# Patient Record
Sex: MALE | Race: WHITE | NOT HISPANIC OR LATINO | Employment: FULL TIME | URBAN - METROPOLITAN AREA
[De-identification: names, ages, dates, MRNs, and addresses within clinical notes are randomized per-mention and may not be internally consistent; named-entity substitution may affect disease eponyms.]

---

## 2017-08-04 ENCOUNTER — APPOINTMENT (OUTPATIENT)
Dept: LAB | Facility: CLINIC | Age: 57
End: 2017-08-04
Payer: COMMERCIAL

## 2017-08-04 DIAGNOSIS — R73.01 IMPAIRED FASTING GLUCOSE: ICD-10-CM

## 2017-08-04 DIAGNOSIS — N52.9 MALE ERECTILE DYSFUNCTION: ICD-10-CM

## 2017-08-04 DIAGNOSIS — E78.5 HYPERLIPIDEMIA: ICD-10-CM

## 2017-08-04 LAB
ALBUMIN SERPL BCP-MCNC: 3.9 G/DL (ref 3.5–5)
ALP SERPL-CCNC: 71 U/L (ref 46–116)
ALT SERPL W P-5'-P-CCNC: 31 U/L (ref 12–78)
ANION GAP SERPL CALCULATED.3IONS-SCNC: 5 MMOL/L (ref 4–13)
AST SERPL W P-5'-P-CCNC: 25 U/L (ref 5–45)
BILIRUB SERPL-MCNC: 1.39 MG/DL (ref 0.2–1)
BUN SERPL-MCNC: 11 MG/DL (ref 5–25)
CALCIUM SERPL-MCNC: 9.5 MG/DL (ref 8.3–10.1)
CHLORIDE SERPL-SCNC: 105 MMOL/L (ref 100–108)
CHOLEST SERPL-MCNC: 187 MG/DL (ref 50–200)
CO2 SERPL-SCNC: 29 MMOL/L (ref 21–32)
CREAT SERPL-MCNC: 0.99 MG/DL (ref 0.6–1.3)
EST. AVERAGE GLUCOSE BLD GHB EST-MCNC: 120 MG/DL
GFR SERPL CREATININE-BSD FRML MDRD: 84 ML/MIN/1.73SQ M
GLUCOSE P FAST SERPL-MCNC: 105 MG/DL (ref 65–99)
HBA1C MFR BLD: 5.8 % (ref 4.2–6.3)
HDLC SERPL-MCNC: 50 MG/DL (ref 40–60)
LDLC SERPL CALC-MCNC: 125 MG/DL (ref 0–100)
POTASSIUM SERPL-SCNC: 4.2 MMOL/L (ref 3.5–5.3)
PROT SERPL-MCNC: 7.8 G/DL (ref 6.4–8.2)
SODIUM SERPL-SCNC: 139 MMOL/L (ref 136–145)
TRIGL SERPL-MCNC: 61 MG/DL

## 2017-08-04 PROCEDURE — 80053 COMPREHEN METABOLIC PANEL: CPT

## 2017-08-04 PROCEDURE — 36415 COLL VENOUS BLD VENIPUNCTURE: CPT

## 2017-08-04 PROCEDURE — 80061 LIPID PANEL: CPT

## 2017-08-04 PROCEDURE — 83036 HEMOGLOBIN GLYCOSYLATED A1C: CPT

## 2017-08-05 ENCOUNTER — GENERIC CONVERSION - ENCOUNTER (OUTPATIENT)
Dept: OTHER | Facility: OTHER | Age: 57
End: 2017-08-05

## 2017-11-09 ENCOUNTER — ALLSCRIPTS OFFICE VISIT (OUTPATIENT)
Dept: OTHER | Facility: OTHER | Age: 57
End: 2017-11-09

## 2017-11-09 DIAGNOSIS — Z00.00 ENCOUNTER FOR GENERAL ADULT MEDICAL EXAMINATION WITHOUT ABNORMAL FINDINGS: ICD-10-CM

## 2017-11-09 DIAGNOSIS — B35.0 TINEA BARBAE AND TINEA CAPITIS: ICD-10-CM

## 2017-11-09 DIAGNOSIS — E78.5 HYPERLIPIDEMIA: ICD-10-CM

## 2017-11-09 DIAGNOSIS — N52.9 MALE ERECTILE DYSFUNCTION: ICD-10-CM

## 2017-11-09 DIAGNOSIS — M54.16 RADICULOPATHY OF LUMBAR REGION: ICD-10-CM

## 2017-11-09 DIAGNOSIS — R73.01 IMPAIRED FASTING GLUCOSE: ICD-10-CM

## 2017-11-09 DIAGNOSIS — Z12.11 ENCOUNTER FOR SCREENING FOR MALIGNANT NEOPLASM OF COLON: ICD-10-CM

## 2017-11-10 NOTE — PROGRESS NOTES
Assessment    1  Hyperlipidemia (272 4) (E78 5)   2  IFG (impaired fasting glucose) (790 21) (R73 01)    Plan  Acute lumbar radiculopathy, ED (erectile dysfunction), Health Maintenance, Encounter forscreening colonoscopy, Folliculitis barbae, Hyperlipidemia, IFG (impaired fastingglucose)    · (1) BASIC METABOLIC PROFILE; Status:Active; Requested for:09Nov2017;    · (1) HEMOGLOBIN A1C; Status:Active; Requested WTF:[id number];    · (1) LIPID PANEL FASTING W DIRECT LDL REFLEX; Status:Active; Requestedfor:09Nov2017;   ED (erectile dysfunction)    · Cialis 5 MG Oral Tablet; TAKE 1 TO 2 TABLETS DAILY [address] [place] [address]    · *VB-Depression Screening; Status:Complete - Retrospective Authorization;   Done:09Nov2017 04:27PM  Hyperlipidemia    · Rosuvastatin Calcium 20 MG Oral Tablet; TAKE 1 TABLET AT BEDTIME    Discussion/Summary    Chronic cond stablein 6 months + labsproper dietflu at pharm  The patient was counseled regarding diagnostic results,-- instructions for management,-- risk factor reductions,-- prognosis,-- impressions,-- risks and benefits of treatment options,-- importance of compliance with treatment  total time of encounter was 30 minutes  Possible side effects of new medications were reviewed with the patient/guardian today  The treatment plan was reviewed with the patient/guardian  The patient/guardian understands and agrees with the treatment plan      Chief Complaint  pt here for follow up visitreceived flu vaccine in October   Patient is here today for follow up of chronic conditions described in HPI  History of Present Illness  Pt presents for follow up HLD labs done in august showed Predm with A1c at 5  8HLD is better control, with Crestor  issues with med and finally neing more complaintexercising again      Review of Systems   Constitutional: No fever or chills, feels well, no tiredness, no recent weight gain or weight loss    Cardiovascular: No complaints of slow heart rate, no fast heart rate, no chest pain, no palpitations, no leg claudication, no lower extremity  Respiratory: No complaints of shortness of breath, no wheezing, no cough, no SOB on exertion, no orthopnea or PND  Gastrointestinal: No complaints of abdominal pain, no constipation, no nausea or vomiting, no diarrhea or bloody stools  Genitourinary: No complaints of dysuria, no incontinence, no hesitancy, no nocturia, no genital lesion, no testicular pain  Musculoskeletal: No complaints of arthralgia, no myalgias, no joint swelling or stiffness, no limb pain or swelling  Active Problems  1  Acute lumbar radiculopathy (724 4) (M54 16)   2  ED (erectile dysfunction) (607 84) (N52 9)   3  Encounter for screening colonoscopy (V76 51) (Z12 11)   4  Folliculitis barbae (389 9) (B35 0)   5  Hyperlipidemia (272 4) (E78 5)   6  IFG (impaired fasting glucose) (790 21) (R73 01)   7  Oily skin (782 9) (R23 4)   8  Sacroiliitis (720 2) (M46 1)    Past Medical History  1  History of sebaceous cyst (V13 3) (Z87 2)   2  Impacted cerumen, unspecified laterality (380 4) (H61 20)    The active problems and past medical history were reviewed and updated today  Surgical History  1  History of Prior Surgical Procedure Not Done    Family History  Mother    1  Family history of Hyperlipidemia  Father    2  Family history of Lung Cancer (V16 1)  Maternal Grandfather    3  Family history of Diabetes Mellitus (V18 0)   4  Family history of Hypertension (V17 49)    Social History     · Alcohol Use (History)   · Caffeine Use   · Never A Smoker   · Occupation:  The social history was reviewed and updated today  Current Meds   1  Cialis 5 MG Oral Tablet; TAKE 1 TO 2 TABLETS DAILY 1 HOUR BEFORE NEEDED; Therapy: 21ZNL4391 to (Last Rx:04Apr2017)  Requested for: 02YPB4339; Status: ACTIVE - Renewal Denied Ordered   2   Diclofenac Sodium 1 % Transdermal Gel; APPLY SPARINGLY - LITE LAYER - UP TO 4 GRAMS PER DOSE, MAX 4 TIMES A DAY TO AFFECTED AREA; Therapy: 35MSB9922 to (Evaluate:14Jan2017)  Requested for: 84VIW7505; Last Rx:73Ksd7085 Ordered   3  Neutrogena Acne Cleansing Soap External Bar; USE AS DIRECTED ON PACKAGE; Therapy: 54ATK7914 to Recorded   4  Neutrogena Oil-Free Acne Wash 2 % External Liquid; use daily for oil control; Therapy: 38CFH8475 to (Last Rx:06Oct2015) Ordered   5  Omega-3-acid Ethyl Esters 1 GM Oral Capsule; TAKE 2 CAPSULES TWICE A DAY; Therapy: 75TVJ2118 to Recorded   6  Rosuvastatin Calcium 20 MG Oral Tablet; TAKE 1 TABLET AT BEDTIME; Therapy: 32SDP4883 to (Last Rx:14Jun2017)  Requested for: 99PDI5962 Ordered    The medication list was reviewed and updated today  Allergies  1  No Known Drug Allergies    Vitals  Vital Signs    Recorded: 92XXS1439 04:19PM   Temperature 98 1 F   Heart Rate 67   Respiration 18   Systolic 000   Diastolic 72   Height 5 ft 10 87 in   Weight 198 lb    BMI Calculated 27 72   BSA Calculated 2 1   O2 Saturation 98   Pain Scale 0       Physical Exam   Constitutional  General appearance: No acute distress, well appearing and well nourished  Pulmonary  Respiratory effort: No increased work of breathing or signs of respiratory distress  Auscultation of lungs: Clear to auscultation, equal breath sounds bilaterally, no wheezes, no rales, no rhonci  Cardiovascular  Auscultation of heart: Normal rate and rhythm, normal S1 and S2, without murmurs  Examination of extremities for edema and/or varicosities: Normal    Abdomen  Abdomen: Non-tender, no masses  Lymphatic  Palpation of lymph nodes in neck: No lymphadenopathy  Musculoskeletal  Gait and station: Normal    Skin  Skin and subcutaneous tissue: Normal without rashes or lesions     Psychiatric  Mood and affect: Normal          Results/Data  *VB-Depression Screening 34NTN0590 04:27PM [facility]     Test Name Result Flag Reference   Depression Scale Result      Depression Screen - Negative For Symptoms     PHQ-2 Adult Depression Screening 23JQR9608 04:26PM User, [facility]     Test Name Result Flag Reference   PHQ-2 Adult Depression Score 0       Over the last two weeks, how often have you been bothered by any of the following problems? Little interest or pleasure in doing things: Not at all - 0 Feeling down, depressed, or hopeless: Not at all - 0   PHQ-2 Adult Depression Screening Negative           Health Management  Encounter for screening colonoscopy   COLONOSCOPY; every 10 years; Next Due: 42PVL6480;  Overdue    Signatures   Electronically signed by : RICKY Quan ; Nov 9 2017  4:52PM EST                       (Author)

## 2018-01-13 VITALS
OXYGEN SATURATION: 98 % | HEIGHT: 71 IN | TEMPERATURE: 98.1 F | HEART RATE: 67 BPM | BODY MASS INDEX: 27.72 KG/M2 | RESPIRATION RATE: 18 BRPM | DIASTOLIC BLOOD PRESSURE: 72 MMHG | SYSTOLIC BLOOD PRESSURE: 118 MMHG | WEIGHT: 198 LBS

## 2018-01-13 NOTE — RESULT NOTES
Message   notify pt his results are back but I dont see a follow up appt 6 mo FU/cholesterol FU  Please make appt and I will discuss results then  Seems like he might not be taking his cholesterol med which we will need to adress at the apt  thanks     Verified Results  (1) COMPREHENSIVE METABOLIC PANEL 51KQR0881 43:72OV Mihir Venturaer Order Number: [id number]     Test Name Result Flag Reference   GLUCOSE,RANDM 110 mg/dL     If the patient is fasting, the ADA then defines impaired fasting glucose as > 100 mg/dL and diabetes as > or equal to 123 mg/dL  SODIUM 139 mmol/L  136-145   POTASSIUM 4 2 mmol/L  3 5-5 3   CHLORIDE 104 mmol/L  100-108   CARBON DIOXIDE 30 mmol/L  21-32   ANION GAP (CALC) 5 mmol/L  4-13   BLOOD UREA NITROGEN 19 mg/dL  5-25   CREATININE 1 12 mg/dL  0 60-1 30   Standardized to IDMS reference method   CALCIUM 9 1 mg/dL  8 3-10 1   BILI, TOTAL 1 06 mg/dL H 0 20-1 00   ALK PHOSPHATAS 88 U/L     ALT (SGPT) 78 U/L  12-78   AST(SGOT) 31 U/L  5-45   ALBUMIN 4 1 g/dL  3 5-5 0   TOTAL PROTEIN 7 9 g/dL  6 4-8 2   eGFR Non-African American      >60 0 ml/min/1 73sq [facility] Disease Education Program recommendations are as follows:  GFR calculation is accurate only with a steady state creatinine  Chronic Kidney disease less than 60 ml/min/1 73 sq  meters  Kidney failure less than 15 ml/min/1 73 sq  meters       (1) LIPID PANEL FASTING W DIRECT LDL REFLEX [id number] 07:20AM Mihir  Order Number: [id number]     Test Name Result Flag Reference   CHOLESTEROL 264 mg/dL H    LDL CHOLESTEROL CALCULATED 192 mg/dL H 0-100   Triglyceride:         Normal              <150 mg/dl       Borderline High    150-199 mg/dl       High               200-499 mg/dl       Very High          >499 mg/dl  Cholesterol:         Desirable        <200 mg/dl      Borderline High  200-239 mg/dl      High             >239 mg/dl  HDL Cholesterol:        High    >59 mg/dL      Low <41 mg/dL  LDL Cholesterol:        Optimal          <100 mg/dl        Near Optimal     100-129 mg/dl        Above Optimal          Borderline High   130-159 mg/dl          High              160-189 mg/dl          Very High        >189 mg/dl  LDL CALCULATED:    This screening LDL is a calculated result  It does not have the accuracy of the Direct Measured LDL in the monitoring of patients with hyperlipidemia and/or statin therapy  Direct Measure LDL (JYX831) must be ordered separately in these patients  TRIGLYCERIDES 102 mg/dL  <=150   Specimen collection should occur prior to N-Acetylcysteine or Metamizole administration due to the potential for falsely depressed results  HDL,DIRECT 52 mg/dL  40-60   Specimen collection should occur prior to Metamizole administration due to the potential for falsely depressed results         Signatures   Electronically signed by : RICKY Quan ; Nov 7 2016  8:44AM EST                       (Author)

## 2018-01-14 NOTE — RESULT NOTES
Message      Please notify pt labs are back cholesterol stable  Continue with SAME dose of crestor  mail him labs to get done prior to next apt in 6 months  tx       Verified Results  (1) LIPID PANEL FASTING W DIRECT LDL REFLEX 01Apr2016 07:41AM Sana Collins   Triglyceride:         Normal              <150 mg/dl       Borderline High    150-199 mg/dl       High               200-499 mg/dl       Very High          >499 mg/dl  Cholesterol:         Desirable        <200 mg/dl      Borderline High  200-239 mg/dl      High             >239 mg/dl  HDL Cholesterol:        High    >59 mg/dL      Low     <41 mg/dL  LDL Cholesterol:        Optimal          <100 mg/dl         Near Optimal     100-129 mg/dl        Above Optimal          Borderline High   130-159 mg/dl          High              160-189 mg/dl          Very High        >189 mg/dl  LDL CALCULATED:    This screening LDL is a calculated result  It does not have the accuracy of the Direct Measured LDL in the monitoring of patients with hyperlipidemia and/or statin therapy  Direct Measure LDL (XPA706) must be ordered separately in these patients  Test Name Result Flag Reference   CHOLESTEROL 222 mg/dL H    LDL CHOLESTEROL CALCULATED 142 mg/dL H 0-100   TRIGLYCERIDES 85 mg/dL  <=150   Specimen collection should occur prior to N-Acetylcysteine or Metamizole administration due to the potential for falsely depressed results  HDL,DIRECT 63 mg/dL H 40-60       Plan  Hyperlipidemia, IFG (impaired fasting glucose)    · (1) COMPREHENSIVE METABOLIC PANEL; Status:Active; Requested for:04Apr2016;    · (1) LIPID PANEL FASTING W DIRECT LDL REFLEX; Status:Active;  Requested  for:04Apr2016;     Signatures   Electronically signed by : RICKY Bustamante ; Apr 4 2016  9:31AM EST                       (Author)

## 2018-01-14 NOTE — RESULT NOTES
Verified Results  (1) HEMOGLOBIN A1C [id number] 10:12AM [facility] Order Number: [id number]     Test Name Result Flag Reference   HEMOGLOBIN A1C 5 8 %  4 2-6 3   EST  AVG  GLUCOSE 120 mg/dl       (1) COMPREHENSIVE METABOLIC PANEL [id number] 75:23XN [facility] Order Number: [id number]     Test Name Result Flag Reference   SODIUM 139 mmol/L  136-145   POTASSIUM 4 2 mmol/L  3 5-5 3   CHLORIDE 105 mmol/L  100-108   CARBON DIOXIDE 29 mmol/L  21-32   ANION GAP (CALC) 5 mmol/L  4-13   BLOOD UREA NITROGEN 11 mg/dL  5-25   CREATININE 0 99 mg/dL  0 60-1 30   Standardized to IDMS reference method   CALCIUM 9 5 mg/dL  8 3-10 1   BILI, TOTAL 1 39 mg/dL H 0 20-1 00   ALK PHOSPHATAS 71 U/L     ALT (SGPT) 31 U/L  12-78   AST(SGOT) 25 U/L  5-45   ALBUMIN 3 9 g/dL  3 5-5 0   TOTAL PROTEIN 7 8 g/dL  6 4-8 2   eGFR 84 ml/min/1 73sq m     National Kidney Disease Education Program recommendations are as follows:  GFR calculation is accurate only with a steady state creatinine  Chronic Kidney disease less than 60 ml/min/1 73 sq  meters  Kidney failure less than 15 ml/min/1 73 sq  meters  GLUCOSE FASTING 105 mg/dL H 65-99     (1) LIPID PANEL FASTING W DIRECT LDL REFLEX [id number] 10:12AM [facility] Order Number: [id number]     Test Name Result Flag Reference   CHOLESTEROL 187 mg/dL     LDL CHOLESTEROL CALCULATED 125 mg/dL H 0-100   - Patient Instructions:  This is a fasting blood test  Water, black tea or black coffee only after 9:00pm the night before test   Drink 2 glasses of water the morning of test       Triglyceride:         Normal              <150 mg/dl       Borderline High    150-199 mg/dl       High               200-499 mg/dl       Very High          >499 mg/dl  Cholesterol:         Desirable        <200 mg/dl      Borderline High  200-239 mg/dl      High             >239 mg/dl  HDL Cholesterol:        High    >59 mg/dL      Low     <41 mg/dL  LDL Cholesterol:        Optimal          <100 mg/dl        Near Optimal     100-129 mg/dl        Above Optimal          Borderline High   130-159 mg/dl          High              160-189 mg/dl          Very High        >189 mg/dl  LDL CALCULATED:    This screening LDL is a calculated result  It does not have the accuracy of the Direct Measured LDL in the monitoring of patients with hyperlipidemia and/or statin therapy  Direct Measure LDL ([id number]) must be ordered separately in these patients  TRIGLYCERIDES 61 mg/dL  <=150   Specimen collection should occur prior to N-Acetylcysteine or Metamizole administration due to the potential for falsely depressed results  HDL,DIRECT 50 mg/dL  40-60   Specimen collection should occur prior to Metamizole administration due to the potential for falsely depressed results         Signatures   Electronically signed by : RICKY Malhotra ; Aug  5 2017  8:37AM EST                       (Author)

## 2018-04-30 ENCOUNTER — APPOINTMENT (OUTPATIENT)
Dept: LAB | Facility: CLINIC | Age: 58
End: 2018-04-30
Payer: COMMERCIAL

## 2018-04-30 DIAGNOSIS — E78.5 HYPERLIPIDEMIA: ICD-10-CM

## 2018-04-30 DIAGNOSIS — Z12.11 ENCOUNTER FOR SCREENING FOR MALIGNANT NEOPLASM OF COLON: ICD-10-CM

## 2018-04-30 DIAGNOSIS — B35.0 TINEA BARBAE AND TINEA CAPITIS: ICD-10-CM

## 2018-04-30 DIAGNOSIS — M54.16 RADICULOPATHY OF LUMBAR REGION: ICD-10-CM

## 2018-04-30 DIAGNOSIS — N52.9 MALE ERECTILE DYSFUNCTION: ICD-10-CM

## 2018-04-30 DIAGNOSIS — Z00.00 ENCOUNTER FOR GENERAL ADULT MEDICAL EXAMINATION WITHOUT ABNORMAL FINDINGS: ICD-10-CM

## 2018-04-30 DIAGNOSIS — R73.01 IMPAIRED FASTING GLUCOSE: ICD-10-CM

## 2018-04-30 LAB
ANION GAP SERPL CALCULATED.3IONS-SCNC: 7 MMOL/L (ref 4–13)
BUN SERPL-MCNC: 18 MG/DL (ref 5–25)
CALCIUM SERPL-MCNC: 9 MG/DL (ref 8.3–10.1)
CHLORIDE SERPL-SCNC: 106 MMOL/L (ref 100–108)
CHOLEST SERPL-MCNC: 251 MG/DL (ref 50–200)
CO2 SERPL-SCNC: 26 MMOL/L (ref 21–32)
CREAT SERPL-MCNC: 0.96 MG/DL (ref 0.6–1.3)
EST. AVERAGE GLUCOSE BLD GHB EST-MCNC: 111 MG/DL
GFR SERPL CREATININE-BSD FRML MDRD: 86 ML/MIN/1.73SQ M
GLUCOSE P FAST SERPL-MCNC: 105 MG/DL (ref 65–99)
HBA1C MFR BLD: 5.5 % (ref 4.2–6.3)
HDLC SERPL-MCNC: 48 MG/DL (ref 40–60)
LDLC SERPL CALC-MCNC: 180 MG/DL (ref 0–100)
POTASSIUM SERPL-SCNC: 3.9 MMOL/L (ref 3.5–5.3)
SODIUM SERPL-SCNC: 139 MMOL/L (ref 136–145)
TRIGL SERPL-MCNC: 116 MG/DL

## 2018-04-30 PROCEDURE — 80061 LIPID PANEL: CPT

## 2018-04-30 PROCEDURE — 83036 HEMOGLOBIN GLYCOSYLATED A1C: CPT

## 2018-04-30 PROCEDURE — 36415 COLL VENOUS BLD VENIPUNCTURE: CPT

## 2018-04-30 PROCEDURE — 80048 BASIC METABOLIC PNL TOTAL CA: CPT

## 2018-05-09 DIAGNOSIS — E78.5 DYSLIPIDEMIA: Primary | ICD-10-CM

## 2018-05-10 RX ORDER — ROSUVASTATIN CALCIUM 20 MG/1
TABLET, COATED ORAL
Qty: 90 TABLET | Refills: 0 | Status: SHIPPED | OUTPATIENT
Start: 2018-05-10 | End: 2018-05-17 | Stop reason: SDUPTHER

## 2018-05-16 NOTE — PROGRESS NOTES
Assessment/Plan:    No problem-specific Assessment & Plan notes found for this encounter  Diagnoses and all orders for this visit:    Erectile dysfunction, unspecified erectile dysfunction type  -     tadalafil (CIALIS) 5 MG tablet; Take 1 tablet (5 mg total) by mouth daily as needed for erectile dysfunction    IFG (impaired fasting glucose)  -     Comprehensive metabolic panel; Future  -     CK (with reflex to MB); Future  -stable A1c decreased from 5 8-5 5    Dyslipidemia  Refused to increase his Crestor any further than 20 milligrams despite my recommendations to do so  -     rosuvastatin (CRESTOR) 20 MG tablet; Take 1 tablet (20 mg total) by mouth daily at bedtime  -     Comprehensive metabolic panel; Future  -     CK (with reflex to MB); Future  omega-3-acid ethyl esters (LOVAZA) 1 g capsule; Take 2 capsules by mouth 2 (two) times a day  Thumb pain, left  -     Elastic Bandages & Supports (THUMB BRACE) MISC; by Does not apply route once for 1 dose Thumb spica  Can take Motrin 2 to 3 times a day for week for his pain  Offer prescription of Voltaren but he refused not interested  He is also not interested in Ortho referral for injection  Screening for colon cancer  -     Cologuard  Refuses colonoscopy  Discussed risks versus benefits of colonoscopy versus cologard  Colonoscopy still consider standard of care  Follow up in 6 month + labs  Subjective:   Pt here for 6 month follow up visit  Our labs done 4/2018  Pt interested in cologuard  Pt complaining of hand pain on left hand thumb area  complaining of possible in grown hair on head     Patient ID: Melanie Fenton is a 60 y o  male  HPI       Patient presents for follow up chronic conditions including prediabetes and hyperlipidemia   Patient states compliant with his Crestor and Omega 3  He admits He is not exercising all winter and has not following a very healthy diet  His LDL did increase to 180 from 125    He attributes this increased due to his lack of exercising and strict low-fat diet  He refuses to increase his Crestor any further than 20 milligrams  He also complains of left base thumb pain with certain movements  He try some ointment no other intervention has been done  He refuses referral to Orthopedics for steroid injections  The following portions of the patient's history were reviewed and updated as appropriate: allergies, current medications, past family history, past medical history, past social history, past surgical history and problem list     Review of Systems   Constitutional: Negative for activity change and appetite change  HENT: Negative  Respiratory: Negative  Cardiovascular: Negative  Gastrointestinal: Negative  Genitourinary: Negative  Musculoskeletal: Positive for arthralgias  Skin: Negative for rash  Psychiatric/Behavioral: Negative  Objective:      /74   Pulse 78   Temp 98 3 °F (36 8 °C)   Ht 5' 11 26" (1 81 m)   Wt 90 3 kg (199 lb)   SpO2 98%   BMI 27 55 kg/m²          Physical Exam   Constitutional: He is oriented to person, place, and time  He appears well-developed and well-nourished  HENT:   Head: Normocephalic  Eyes: Conjunctivae are normal    Cardiovascular: Normal rate, regular rhythm and normal heart sounds  Pulmonary/Chest: Effort normal and breath sounds normal  No respiratory distress  He has no wheezes  He has no rales  Neurological: He is alert and oriented to person, place, and time  Psychiatric: He has a normal mood and affect   His behavior is normal

## 2018-05-17 ENCOUNTER — OFFICE VISIT (OUTPATIENT)
Dept: FAMILY MEDICINE CLINIC | Facility: CLINIC | Age: 58
End: 2018-05-17
Payer: COMMERCIAL

## 2018-05-17 VITALS
HEART RATE: 78 BPM | SYSTOLIC BLOOD PRESSURE: 118 MMHG | BODY MASS INDEX: 27.86 KG/M2 | DIASTOLIC BLOOD PRESSURE: 74 MMHG | WEIGHT: 199 LBS | TEMPERATURE: 98.3 F | OXYGEN SATURATION: 98 % | HEIGHT: 71 IN

## 2018-05-17 DIAGNOSIS — M79.645 THUMB PAIN, LEFT: ICD-10-CM

## 2018-05-17 DIAGNOSIS — E78.5 HYPERLIPIDEMIA, UNSPECIFIED HYPERLIPIDEMIA TYPE: ICD-10-CM

## 2018-05-17 DIAGNOSIS — N52.9 ERECTILE DYSFUNCTION, UNSPECIFIED ERECTILE DYSFUNCTION TYPE: Primary | ICD-10-CM

## 2018-05-17 DIAGNOSIS — R73.01 IFG (IMPAIRED FASTING GLUCOSE): ICD-10-CM

## 2018-05-17 DIAGNOSIS — E78.5 DYSLIPIDEMIA: ICD-10-CM

## 2018-05-17 DIAGNOSIS — Z12.11 SCREENING FOR COLON CANCER: ICD-10-CM

## 2018-05-17 PROCEDURE — 1036F TOBACCO NON-USER: CPT | Performed by: FAMILY MEDICINE

## 2018-05-17 PROCEDURE — 99214 OFFICE O/P EST MOD 30 MIN: CPT | Performed by: FAMILY MEDICINE

## 2018-05-17 PROCEDURE — 3008F BODY MASS INDEX DOCD: CPT | Performed by: FAMILY MEDICINE

## 2018-05-17 RX ORDER — OMEGA-3-ACID ETHYL ESTERS 1 G/1
2 CAPSULE, LIQUID FILLED ORAL 2 TIMES DAILY
COMMUNITY
Start: 2015-09-04

## 2018-05-17 RX ORDER — ROSUVASTATIN CALCIUM 20 MG/1
20 TABLET, COATED ORAL
Qty: 90 TABLET | Refills: 1 | Status: SHIPPED | OUTPATIENT
Start: 2018-05-17 | End: 2019-02-05 | Stop reason: SDUPTHER

## 2018-05-17 RX ORDER — ROSUVASTATIN CALCIUM 40 MG/1
20 TABLET, COATED ORAL
Qty: 90 TABLET | Refills: 1 | Status: SHIPPED | OUTPATIENT
Start: 2018-05-17 | End: 2018-05-17 | Stop reason: SDUPTHER

## 2018-05-17 RX ORDER — TADALAFIL 5 MG/1
5 TABLET ORAL DAILY PRN
Qty: 10 TABLET | Refills: 0 | Status: SHIPPED | OUTPATIENT
Start: 2018-05-17 | End: 2018-06-14 | Stop reason: SDUPTHER

## 2018-05-17 RX ORDER — TADALAFIL 5 MG/1
TABLET ORAL
COMMUNITY
Start: 2015-05-20 | End: 2018-05-17 | Stop reason: SDUPTHER

## 2018-06-14 DIAGNOSIS — N52.9 ERECTILE DYSFUNCTION, UNSPECIFIED ERECTILE DYSFUNCTION TYPE: ICD-10-CM

## 2018-06-14 RX ORDER — TADALAFIL 5 MG/1
5 TABLET ORAL DAILY PRN
Qty: 60 TABLET | Refills: 0 | Status: SHIPPED | OUTPATIENT
Start: 2018-06-14 | End: 2019-07-22 | Stop reason: SDUPTHER

## 2018-08-08 DIAGNOSIS — E78.5 DYSLIPIDEMIA: ICD-10-CM

## 2018-08-08 RX ORDER — ROSUVASTATIN CALCIUM 20 MG/1
TABLET, COATED ORAL
Qty: 90 TABLET | Refills: 1 | Status: SHIPPED | OUTPATIENT
Start: 2018-08-08 | End: 2019-02-04 | Stop reason: SDUPTHER

## 2018-12-19 ENCOUNTER — APPOINTMENT (OUTPATIENT)
Dept: LAB | Facility: CLINIC | Age: 58
End: 2018-12-19
Payer: COMMERCIAL

## 2018-12-19 DIAGNOSIS — R73.01 IFG (IMPAIRED FASTING GLUCOSE): ICD-10-CM

## 2018-12-19 DIAGNOSIS — E78.5 DYSLIPIDEMIA: ICD-10-CM

## 2018-12-19 DIAGNOSIS — N52.9 ERECTILE DYSFUNCTION, UNSPECIFIED ERECTILE DYSFUNCTION TYPE: ICD-10-CM

## 2018-12-19 DIAGNOSIS — E78.5 HYPERLIPIDEMIA, UNSPECIFIED HYPERLIPIDEMIA TYPE: ICD-10-CM

## 2018-12-19 LAB
ALBUMIN SERPL BCP-MCNC: 4 G/DL (ref 3.5–5)
ALP SERPL-CCNC: 73 U/L (ref 46–116)
ALT SERPL W P-5'-P-CCNC: 43 U/L (ref 12–78)
ANION GAP SERPL CALCULATED.3IONS-SCNC: 4 MMOL/L (ref 4–13)
AST SERPL W P-5'-P-CCNC: 28 U/L (ref 5–45)
BILIRUB SERPL-MCNC: 0.99 MG/DL (ref 0.2–1)
BUN SERPL-MCNC: 15 MG/DL (ref 5–25)
CALCIUM SERPL-MCNC: 9 MG/DL (ref 8.3–10.1)
CHLORIDE SERPL-SCNC: 105 MMOL/L (ref 100–108)
CHOLEST SERPL-MCNC: 233 MG/DL (ref 50–200)
CK SERPL-CCNC: 101 U/L (ref 39–308)
CO2 SERPL-SCNC: 28 MMOL/L (ref 21–32)
CREAT SERPL-MCNC: 1 MG/DL (ref 0.6–1.3)
GFR SERPL CREATININE-BSD FRML MDRD: 82 ML/MIN/1.73SQ M
GLUCOSE P FAST SERPL-MCNC: 97 MG/DL (ref 65–99)
HDLC SERPL-MCNC: 50 MG/DL (ref 40–60)
LDLC SERPL CALC-MCNC: 163 MG/DL (ref 0–100)
POTASSIUM SERPL-SCNC: 3.7 MMOL/L (ref 3.5–5.3)
PROT SERPL-MCNC: 7.9 G/DL (ref 6.4–8.2)
SODIUM SERPL-SCNC: 137 MMOL/L (ref 136–145)
TRIGL SERPL-MCNC: 99 MG/DL

## 2018-12-19 PROCEDURE — 36415 COLL VENOUS BLD VENIPUNCTURE: CPT

## 2018-12-19 PROCEDURE — 80053 COMPREHEN METABOLIC PANEL: CPT

## 2018-12-19 PROCEDURE — 82550 ASSAY OF CK (CPK): CPT

## 2018-12-19 PROCEDURE — 80061 LIPID PANEL: CPT

## 2019-02-04 DIAGNOSIS — E78.5 DYSLIPIDEMIA: ICD-10-CM

## 2019-02-05 RX ORDER — ROSUVASTATIN CALCIUM 20 MG/1
TABLET, COATED ORAL
Qty: 90 TABLET | Refills: 0 | Status: SHIPPED | OUTPATIENT
Start: 2019-02-05 | End: 2019-07-22 | Stop reason: SDUPTHER

## 2019-02-13 ENCOUNTER — TELEPHONE (OUTPATIENT)
Dept: FAMILY MEDICINE CLINIC | Facility: CLINIC | Age: 59
End: 2019-02-13

## 2019-03-25 DIAGNOSIS — N52.9 ERECTILE DYSFUNCTION, UNSPECIFIED ERECTILE DYSFUNCTION TYPE: ICD-10-CM

## 2019-03-26 RX ORDER — TADALAFIL 5 MG/1
5 TABLET ORAL DAILY PRN
Qty: 60 TABLET | Refills: 0 | OUTPATIENT
Start: 2019-03-26

## 2019-03-26 NOTE — TELEPHONE ENCOUNTER
Patient needs to have a follow-up appointment scheduled, and will need fasting labwork done prior to that visit so that we have current information to review at the visit  When patient has an appointment, please let me know so that I may order the labwork

## 2019-03-29 ENCOUNTER — TELEPHONE (OUTPATIENT)
Dept: FAMILY MEDICINE CLINIC | Facility: CLINIC | Age: 59
End: 2019-03-29

## 2019-04-04 DIAGNOSIS — N52.9 ERECTILE DYSFUNCTION, UNSPECIFIED ERECTILE DYSFUNCTION TYPE: ICD-10-CM

## 2019-04-04 RX ORDER — TADALAFIL 5 MG/1
5 TABLET ORAL DAILY PRN
Qty: 60 TABLET | Refills: 0 | OUTPATIENT
Start: 2019-04-04

## 2019-05-06 DIAGNOSIS — E78.5 DYSLIPIDEMIA: ICD-10-CM

## 2019-05-07 RX ORDER — ROSUVASTATIN CALCIUM 20 MG/1
TABLET, COATED ORAL
Qty: 90 TABLET | Refills: 0 | OUTPATIENT
Start: 2019-05-07

## 2019-05-08 ENCOUNTER — TELEPHONE (OUTPATIENT)
Dept: FAMILY MEDICINE CLINIC | Facility: CLINIC | Age: 59
End: 2019-05-08

## 2019-06-20 ENCOUNTER — TELEPHONE (OUTPATIENT)
Dept: FAMILY MEDICINE CLINIC | Facility: CLINIC | Age: 59
End: 2019-06-20

## 2019-06-20 DIAGNOSIS — E78.5 HYPERLIPIDEMIA, UNSPECIFIED HYPERLIPIDEMIA TYPE: ICD-10-CM

## 2019-06-20 DIAGNOSIS — R73.01 IFG (IMPAIRED FASTING GLUCOSE): Primary | ICD-10-CM

## 2019-07-11 ENCOUNTER — APPOINTMENT (OUTPATIENT)
Dept: LAB | Facility: CLINIC | Age: 59
End: 2019-07-11
Payer: COMMERCIAL

## 2019-07-11 DIAGNOSIS — R73.01 IFG (IMPAIRED FASTING GLUCOSE): ICD-10-CM

## 2019-07-11 DIAGNOSIS — E78.5 HYPERLIPIDEMIA, UNSPECIFIED HYPERLIPIDEMIA TYPE: ICD-10-CM

## 2019-07-11 LAB
ALBUMIN SERPL BCP-MCNC: 3.9 G/DL (ref 3.5–5)
ALP SERPL-CCNC: 68 U/L (ref 46–116)
ALT SERPL W P-5'-P-CCNC: 33 U/L (ref 12–78)
ANION GAP SERPL CALCULATED.3IONS-SCNC: 5 MMOL/L (ref 4–13)
AST SERPL W P-5'-P-CCNC: 23 U/L (ref 5–45)
BASOPHILS # BLD AUTO: 0.04 THOUSANDS/ΜL (ref 0–0.1)
BASOPHILS NFR BLD AUTO: 1 % (ref 0–1)
BILIRUB SERPL-MCNC: 0.86 MG/DL (ref 0.2–1)
BUN SERPL-MCNC: 13 MG/DL (ref 5–25)
CALCIUM SERPL-MCNC: 8.9 MG/DL (ref 8.3–10.1)
CHLORIDE SERPL-SCNC: 105 MMOL/L (ref 100–108)
CHOLEST SERPL-MCNC: 208 MG/DL (ref 50–200)
CO2 SERPL-SCNC: 26 MMOL/L (ref 21–32)
CREAT SERPL-MCNC: 0.93 MG/DL (ref 0.6–1.3)
EOSINOPHIL # BLD AUTO: 0.2 THOUSAND/ΜL (ref 0–0.61)
EOSINOPHIL NFR BLD AUTO: 3 % (ref 0–6)
ERYTHROCYTE [DISTWIDTH] IN BLOOD BY AUTOMATED COUNT: 12.3 % (ref 11.6–15.1)
EST. AVERAGE GLUCOSE BLD GHB EST-MCNC: 123 MG/DL
GFR SERPL CREATININE-BSD FRML MDRD: 89 ML/MIN/1.73SQ M
GLUCOSE P FAST SERPL-MCNC: 93 MG/DL (ref 65–99)
HBA1C MFR BLD: 5.9 % (ref 4.2–6.3)
HCT VFR BLD AUTO: 47.2 % (ref 36.5–49.3)
HDLC SERPL-MCNC: 50 MG/DL (ref 40–60)
HGB BLD-MCNC: 15.8 G/DL (ref 12–17)
IMM GRANULOCYTES # BLD AUTO: 0 THOUSAND/UL (ref 0–0.2)
IMM GRANULOCYTES NFR BLD AUTO: 0 % (ref 0–2)
LDLC SERPL CALC-MCNC: 144 MG/DL (ref 0–100)
LYMPHOCYTES # BLD AUTO: 1.62 THOUSANDS/ΜL (ref 0.6–4.47)
LYMPHOCYTES NFR BLD AUTO: 25 % (ref 14–44)
MCH RBC QN AUTO: 30.7 PG (ref 26.8–34.3)
MCHC RBC AUTO-ENTMCNC: 33.5 G/DL (ref 31.4–37.4)
MCV RBC AUTO: 92 FL (ref 82–98)
MONOCYTES # BLD AUTO: 0.63 THOUSAND/ΜL (ref 0.17–1.22)
MONOCYTES NFR BLD AUTO: 10 % (ref 4–12)
NEUTROPHILS # BLD AUTO: 3.89 THOUSANDS/ΜL (ref 1.85–7.62)
NEUTS SEG NFR BLD AUTO: 61 % (ref 43–75)
NRBC BLD AUTO-RTO: 0 /100 WBCS
PLATELET # BLD AUTO: 253 THOUSANDS/UL (ref 149–390)
PMV BLD AUTO: 9.5 FL (ref 8.9–12.7)
POTASSIUM SERPL-SCNC: 3.8 MMOL/L (ref 3.5–5.3)
PROT SERPL-MCNC: 7.4 G/DL (ref 6.4–8.2)
RBC # BLD AUTO: 5.14 MILLION/UL (ref 3.88–5.62)
SODIUM SERPL-SCNC: 136 MMOL/L (ref 136–145)
TRIGL SERPL-MCNC: 71 MG/DL
WBC # BLD AUTO: 6.38 THOUSAND/UL (ref 4.31–10.16)

## 2019-07-11 PROCEDURE — 80061 LIPID PANEL: CPT

## 2019-07-11 PROCEDURE — 36415 COLL VENOUS BLD VENIPUNCTURE: CPT

## 2019-07-11 PROCEDURE — 85025 COMPLETE CBC W/AUTO DIFF WBC: CPT

## 2019-07-11 PROCEDURE — 80053 COMPREHEN METABOLIC PANEL: CPT

## 2019-07-11 PROCEDURE — 83036 HEMOGLOBIN GLYCOSYLATED A1C: CPT

## 2019-07-17 NOTE — PROGRESS NOTES
Assessment/Plan:    IFG (impaired fasting glucose)  Stable HgbA1c at 5 9  Continue therapeutic lifestyle changes  Recommend considering the Whole Food Plant Based Diet  ED (erectile dysfunction)  Continue Cialis PRN  Hyperlipidemia LDL goal <100  Continue therapeutic lifestyle changes  Consider the Whole Food Plant Based Diet  LDL at 144, and have not been to goal in several years  Will increase rosuvastatin to 40 mg daily to work on better control of LDL  Plan to reassess with labwork and follow-up in 6 months  Diagnoses and all orders for this visit:    Hyperlipidemia LDL goal <100  -     Discontinue: rosuvastatin (CRESTOR) 40 MG tablet; Take 0 5 tablets (20 mg total) by mouth daily at bedtime  -     Lipid Panel with Direct LDL reflex; Future  -     Comprehensive metabolic panel; Future  -     Discontinue: rosuvastatin (CRESTOR) 40 MG tablet; Take 0 5 tablets (20 mg total) by mouth daily at bedtime  -     rosuvastatin (CRESTOR) 40 MG tablet; Take 1 tablet (40 mg total) by mouth daily at bedtime    IFG (impaired fasting glucose)    Erectile dysfunction, unspecified erectile dysfunction type  -     tadalafil (CIALIS) 5 MG tablet; Take 1 tablet (5 mg total) by mouth daily as needed for erectile dysfunction    Need for hepatitis C screening test  -     Hepatitis C antibody; Future    Other orders  -     Cancel: Ambulatory referral to Gastroenterology; Future          Subjective:  Patient is here for a physical exam     Health Maintenance Due   Topic Date Due    Hepatitis C Screening  1960    BMI: Followup Plan  03/27/1977          Patient ID: Chani Enamorado is a 60 y o  male  Hyperlipidemia   This is a chronic problem  The current episode started more than 1 year ago  The problem is uncontrolled  Recent lipid tests were reviewed and are high  Exacerbating diseases include diabetes  He has no history of obesity  Factors aggravating his hyperlipidemia include fatty foods   Pertinent negatives include no chest pain, myalgias or shortness of breath  Current antihyperlipidemic treatment includes statins and exercise  The current treatment provides moderate improvement of lipids  Compliance problems: reports not taking statin medication regularly  Risk factors for coronary artery disease include dyslipidemia and male sex  Erectile Dysfunction   This is a chronic problem  The current episode started more than 1 year ago  The problem is unchanged  Past treatments include tadalafil  The treatment provided significant relief  He has had no adverse reactions caused by medications  Blood Sugar Problem   This is a chronic problem  The current episode started more than 1 year ago  The problem has been unchanged  Pertinent negatives include no chest pain or myalgias  The symptoms are aggravated by eating  He has tried eating for the symptoms  The treatment provided moderate relief  The following portions of the patient's history were reviewed and updated as appropriate: allergies, current medications, past family history, past medical history, past social history, past surgical history and problem list     Review of Systems   Respiratory: Negative for shortness of breath  Cardiovascular: Negative for chest pain  Musculoskeletal: Negative for myalgias  Objective:      /74 (BP Location: Left arm, Patient Position: Sitting, Cuff Size: Standard)   Pulse 78   Temp 98 °F (36 7 °C) (Oral)   Resp 18   Ht 6' (1 829 m)   Wt 84 4 kg (186 lb)   SpO2 98%   BMI 25 23 kg/m²          Physical Exam   Constitutional: He is oriented to person, place, and time  He appears well-developed and well-nourished  He is cooperative  HENT:   Head: Normocephalic and atraumatic     Right Ear: Hearing, tympanic membrane, external ear and ear canal normal    Left Ear: Hearing, tympanic membrane, external ear and ear canal normal    Mouth/Throat: Uvula is midline, oropharynx is clear and moist and mucous membranes are normal    Eyes: Pupils are equal, round, and reactive to light  Conjunctivae and lids are normal    Neck: Trachea normal and normal range of motion  Neck supple  No thyromegaly present  Cardiovascular: Normal rate, regular rhythm and normal heart sounds  No murmur heard  Pulses:       Posterior tibial pulses are 2+ on the right side, and 2+ on the left side  Pulmonary/Chest: Effort normal and breath sounds normal    Abdominal: Soft  Normal appearance and bowel sounds are normal  There is no tenderness  Musculoskeletal: Normal range of motion  Right ankle: He exhibits no swelling  Left ankle: He exhibits no swelling  Lymphadenopathy:     He has no cervical adenopathy  Neurological: He is alert and oriented to person, place, and time  He exhibits normal muscle tone  Gait normal    Skin: Skin is warm, dry and intact  Psychiatric: He has a normal mood and affect  His speech is normal and behavior is normal    Nursing note and vitals reviewed  BMI Counseling: Body mass index is 25 23 kg/m²  Discussed the patient's BMI with him  The BMI is above average  BMI counseling and education was provided to the patient  Nutrition recommendations include 3-5 servings of fruits/vegetables daily and decreasing soda and/or juice intake

## 2019-07-22 ENCOUNTER — OFFICE VISIT (OUTPATIENT)
Dept: FAMILY MEDICINE CLINIC | Facility: CLINIC | Age: 59
End: 2019-07-22
Payer: COMMERCIAL

## 2019-07-22 VITALS
RESPIRATION RATE: 18 BRPM | TEMPERATURE: 98 F | SYSTOLIC BLOOD PRESSURE: 116 MMHG | DIASTOLIC BLOOD PRESSURE: 74 MMHG | HEIGHT: 72 IN | OXYGEN SATURATION: 98 % | HEART RATE: 78 BPM | WEIGHT: 186 LBS | BODY MASS INDEX: 25.19 KG/M2

## 2019-07-22 DIAGNOSIS — Z00.01 ENCOUNTER FOR HEALTH MAINTENANCE EXAMINATION WITH ABNORMAL FINDINGS: Primary | ICD-10-CM

## 2019-07-22 DIAGNOSIS — Z11.59 NEED FOR HEPATITIS C SCREENING TEST: ICD-10-CM

## 2019-07-22 DIAGNOSIS — R73.01 IFG (IMPAIRED FASTING GLUCOSE): ICD-10-CM

## 2019-07-22 DIAGNOSIS — N52.9 ERECTILE DYSFUNCTION, UNSPECIFIED ERECTILE DYSFUNCTION TYPE: ICD-10-CM

## 2019-07-22 DIAGNOSIS — E78.5 HYPERLIPIDEMIA LDL GOAL <100: ICD-10-CM

## 2019-07-22 PROBLEM — M79.645 THUMB PAIN, LEFT: Status: RESOLVED | Noted: 2018-05-17 | Resolved: 2019-07-22

## 2019-07-22 PROCEDURE — 99396 PREV VISIT EST AGE 40-64: CPT | Performed by: FAMILY MEDICINE

## 2019-07-22 RX ORDER — ROSUVASTATIN CALCIUM 40 MG/1
40 TABLET, COATED ORAL
Qty: 90 TABLET | Refills: 1 | Status: SHIPPED | OUTPATIENT
Start: 2019-07-22 | End: 2020-01-22 | Stop reason: SDUPTHER

## 2019-07-22 RX ORDER — TADALAFIL 5 MG/1
5 TABLET ORAL DAILY PRN
Qty: 60 TABLET | Refills: 0 | Status: SHIPPED | OUTPATIENT
Start: 2019-07-22 | End: 2019-07-22 | Stop reason: SDUPTHER

## 2019-07-22 RX ORDER — TADALAFIL 5 MG/1
5 TABLET ORAL DAILY PRN
Qty: 60 TABLET | Refills: 0 | Status: SHIPPED | OUTPATIENT
Start: 2019-07-22 | End: 2020-01-22 | Stop reason: SDUPTHER

## 2019-07-22 RX ORDER — ROSUVASTATIN CALCIUM 40 MG/1
20 TABLET, COATED ORAL
Qty: 90 TABLET | Refills: 1 | Status: SHIPPED | OUTPATIENT
Start: 2019-07-22 | End: 2019-07-22 | Stop reason: SDUPTHER

## 2019-07-22 NOTE — ASSESSMENT & PLAN NOTE
Continue therapeutic lifestyle changes  Consider the Whole Food Plant Based Diet  LDL at 144, and have not been to goal in several years  Will increase rosuvastatin to 40 mg daily to work on better control of LDL  Plan to reassess with labwork and follow-up in 6 months

## 2019-07-22 NOTE — ASSESSMENT & PLAN NOTE
Stable HgbA1c at 5 9  Continue therapeutic lifestyle changes  Recommend considering the Whole Food Plant Based Diet

## 2019-07-22 NOTE — PATIENT INSTRUCTIONS
Vegetarian Diet   WHAT YOU NEED TO KNOW:   What is a vegetarian diet? A vegetarian diet has no meat, fish, seafood, poultry (chicken or turkey), or products that contain these foods  Vegetarians eat mostly plant foods such as grains, vegetables, fruits, dried beans, nuts, and seeds  When planned carefully, a vegetarian diet can provide all the nutrients you need and be very healthy for you  A vegetarian diet may help you control your weight and may prevent some cancers  This diet may also help to lower your blood cholesterol and blood pressure  There are 3 types of vegetarian diets:  · A [id number]  diet includes dairy products, eggs, and plant foods  · A lacto-vegetarian  diet includes dairy products and plant foods  · A vegan  diet includes only plant foods  What other diet guidelines should I follow? It may be difficult to get enough of certain vitamins and minerals on a vegetarian diet  Include foods that are good sources of the following vitamins and minerals to make sure you get enough of these nutrients  Talk to your dietitian if you have questions or concerns about any of these nutrients  · Vitamin B-12  is found in dairy products and eggs  If you follow a vegan diet, you may need supplements or foods with added B-12  Some foods high in B-12 are fortified ready-to-eat breakfast cereals and fortified soy milk  Vegetarian meat substitutes, such as burgers or hotdogs made with soy, may also have vitamin B-12  · Calcium  is found in dairy products  If you do not eat dairy, you need to be sure to get enough calcium from other foods  Foods high in calcium include broccoli, philip greens, kale, bok deborah, and calcium-fortified juice  · Vitamin D  is found in fortified cow's milk, some brands of soy milk, and some types of fruit juice  Your body also makes vitamin D when you are exposed to the sun  Stay in the sun for 5 to 15 minutes every day to get enough vitamin D   When you are in the sun, be sure your hands, arms, and face are exposed to sunlight  · Iron and zinc  are in dried beans, peas, soy, nuts, and enriched grains such as bran flakes and oatmeal  To increase absorption of iron from plant foods, eat them with a source of vitamin C  Foods that contain vitamin C include orange juice, oranges, strawberries, grapefruit, cantaloupe, and broccoli  · Omega-3 fatty acids  are fats that are important for heart health and eye and brain development  EPA and DHA are 2 types of fats that are found in higher amounts in oily fish  They are also found in lower amounts in dairy products and eggs  Some foods that may be fortified with DHA include soy milk and breakfast bars  Alpha-linolenic acid may help increase the amount of omega-3 fatty acids in the body  Foods that are high in alpha-linolenic acid include flaxseed oil, canola oil, soybean oil, soybeans, and walnuts  What can I eat and drink while on a vegetarian diet? · Grains:  Choose whole grains for at least half of your grain servings each day  ¨ Whole-wheat bread, roll, English muffin, bagel, or sandwich bun    ¨ Cooked whole-grain pasta, or white, brown, or wild rice    ¨ Whole-grain dry cereal low in sugar, or cooked cereal such as oatmeal    ¨ Tortillas    ¨ Crackers    · Fruits and vegetables:      ¨ All fresh and dried fruits, canned fruit in juice, or frozen fruit without added sugar    ¨ All fresh vegetables, especially fresh dark green, red, or orange vegetables    ¨ Low-sodium canned or frozen vegetables    · Dairy:      ¨ Low-fat or fat-free milk    ¨ Low-fat buttermilk or evaporated skim milk    ¨ Fortified soy milk    ¨ Low-fat or fat-free yogurt    ¨ Nonfat or low-fat cheese    · Protein foods:      ¨ Dried beans, split peas, or lentils    ¨ Soybeans, soy tofu or tempeh, or soy milk    ¨ Unsalted nuts or nut butters     ¨ Eggs    ¨ Vegetarian meat substitute  What are the risks of following a vegetarian diet?    · You may not receive enough of certain nutrients on a vegetarian diet  Talk to a dietitian if you are pregnant or breastfeeding and you do not eat eggs and milk  When you are pregnant or breastfeeding, your body needs more nutrients to support both you and your baby  A dietitian can tell you if your diet is too low in vitamins and minerals, and tell you how to increase them  · Talk to a dietitian if you have a child younger than 5 years who follows a vegetarian diet  Children younger than 5 years are rapidly growing and developing in all areas  Because of this, it is important to make sure they are getting all the nutrients they need  When should I contact my healthcare provider? · You have questions or concerns about your condition or care  CARE AGREEMENT:   You have the right to help plan your care  Discuss treatment options with your caregivers to decide what care you want to receive  You always have the right to refuse treatment  The above information is an  only  It is not intended as medical advice for individual conditions or treatments  Talk to your doctor, nurse or pharmacist before following any medical regimen to see if it is safe and effective for you  © 2017 [employer]  Information is for End User's use only and may not be sold, redistributed or otherwise used for commercial purposes  All illustrations and images included in CareNotes® are the copyrighted property of [employer]  or [employer]        Consider the [employer] Plant Based Diet

## 2020-01-17 ENCOUNTER — APPOINTMENT (OUTPATIENT)
Dept: LAB | Facility: CLINIC | Age: 60
End: 2020-01-17
Payer: COMMERCIAL

## 2020-01-17 DIAGNOSIS — Z11.59 NEED FOR HEPATITIS C SCREENING TEST: ICD-10-CM

## 2020-01-17 DIAGNOSIS — E78.5 HYPERLIPIDEMIA LDL GOAL <100: ICD-10-CM

## 2020-01-17 LAB
ALBUMIN SERPL BCP-MCNC: 4.2 G/DL (ref 3.5–5)
ALP SERPL-CCNC: 75 U/L (ref 46–116)
ALT SERPL W P-5'-P-CCNC: 52 U/L (ref 12–78)
ANION GAP SERPL CALCULATED.3IONS-SCNC: 3 MMOL/L (ref 4–13)
AST SERPL W P-5'-P-CCNC: 27 U/L (ref 5–45)
BILIRUB SERPL-MCNC: 1.22 MG/DL (ref 0.2–1)
BUN SERPL-MCNC: 14 MG/DL (ref 5–25)
CALCIUM SERPL-MCNC: 9.6 MG/DL (ref 8.3–10.1)
CHLORIDE SERPL-SCNC: 106 MMOL/L (ref 100–108)
CHOLEST SERPL-MCNC: 190 MG/DL (ref 50–200)
CO2 SERPL-SCNC: 29 MMOL/L (ref 21–32)
CREAT SERPL-MCNC: 1.01 MG/DL (ref 0.6–1.3)
GFR SERPL CREATININE-BSD FRML MDRD: 80 ML/MIN/1.73SQ M
GLUCOSE P FAST SERPL-MCNC: 100 MG/DL (ref 65–99)
HCV AB SER QL: NORMAL
HDLC SERPL-MCNC: 63 MG/DL
LDLC SERPL CALC-MCNC: 109 MG/DL (ref 0–100)
POTASSIUM SERPL-SCNC: 4.1 MMOL/L (ref 3.5–5.3)
PROT SERPL-MCNC: 8.2 G/DL (ref 6.4–8.2)
SODIUM SERPL-SCNC: 138 MMOL/L (ref 136–145)
TRIGL SERPL-MCNC: 92 MG/DL

## 2020-01-17 PROCEDURE — 80053 COMPREHEN METABOLIC PANEL: CPT

## 2020-01-17 PROCEDURE — 80061 LIPID PANEL: CPT

## 2020-01-17 PROCEDURE — 86803 HEPATITIS C AB TEST: CPT

## 2020-01-17 PROCEDURE — 36415 COLL VENOUS BLD VENIPUNCTURE: CPT

## 2020-01-22 ENCOUNTER — OFFICE VISIT (OUTPATIENT)
Dept: FAMILY MEDICINE CLINIC | Facility: CLINIC | Age: 60
End: 2020-01-22
Payer: COMMERCIAL

## 2020-01-22 VITALS
HEIGHT: 72 IN | WEIGHT: 191 LBS | BODY MASS INDEX: 25.87 KG/M2 | SYSTOLIC BLOOD PRESSURE: 108 MMHG | DIASTOLIC BLOOD PRESSURE: 74 MMHG | OXYGEN SATURATION: 98 % | TEMPERATURE: 98.6 F | HEART RATE: 78 BPM | RESPIRATION RATE: 17 BRPM

## 2020-01-22 DIAGNOSIS — R73.01 IFG (IMPAIRED FASTING GLUCOSE): ICD-10-CM

## 2020-01-22 DIAGNOSIS — L98.9 SKIN LESION OF SCALP: ICD-10-CM

## 2020-01-22 DIAGNOSIS — N52.9 ERECTILE DYSFUNCTION, UNSPECIFIED ERECTILE DYSFUNCTION TYPE: ICD-10-CM

## 2020-01-22 DIAGNOSIS — E78.5 HYPERLIPIDEMIA LDL GOAL <100: Primary | ICD-10-CM

## 2020-01-22 PROCEDURE — 99214 OFFICE O/P EST MOD 30 MIN: CPT | Performed by: FAMILY MEDICINE

## 2020-01-22 PROCEDURE — 1036F TOBACCO NON-USER: CPT | Performed by: FAMILY MEDICINE

## 2020-01-22 PROCEDURE — 3008F BODY MASS INDEX DOCD: CPT | Performed by: FAMILY MEDICINE

## 2020-01-22 RX ORDER — TADALAFIL 5 MG/1
5 TABLET ORAL DAILY PRN
Qty: 60 TABLET | Refills: 1 | Status: SHIPPED | OUTPATIENT
Start: 2020-01-22 | End: 2020-07-30 | Stop reason: SDUPTHER

## 2020-01-22 RX ORDER — ROSUVASTATIN CALCIUM 40 MG/1
40 TABLET, COATED ORAL
Qty: 90 TABLET | Refills: 1 | Status: SHIPPED | OUTPATIENT
Start: 2020-01-22 | End: 2020-01-22 | Stop reason: SDUPTHER

## 2020-01-22 RX ORDER — TADALAFIL 5 MG/1
5 TABLET ORAL DAILY PRN
Qty: 60 TABLET | Refills: 1 | Status: SHIPPED | OUTPATIENT
Start: 2020-01-22 | End: 2020-01-22 | Stop reason: SDUPTHER

## 2020-01-22 RX ORDER — ROSUVASTATIN CALCIUM 40 MG/1
40 TABLET, COATED ORAL
Qty: 90 TABLET | Refills: 1 | Status: SHIPPED | OUTPATIENT
Start: 2020-01-22 | End: 2020-07-21

## 2020-01-22 NOTE — ASSESSMENT & PLAN NOTE
Patient has a skin lesion on the right side of the scalp that he notices when shaving his hair  He believes he has had it removed in the past and was told it was a wart  Will refer to dermatology for further evaluation and management

## 2020-01-22 NOTE — PROGRESS NOTES
Assessment/Plan:    IFG (impaired fasting glucose)  Continue to work on diet modification  Fasting glucose on current lab is 100  Will continue to monitor  Will assess HgbA1c prior to next office visit  Skin lesion of scalp  Patient has a skin lesion on the right side of the scalp that he notices when shaving his hair  He believes he has had it removed in the past and was told it was a wart  Will refer to dermatology for further evaluation and management  Hyperlipidemia LDL goal <100  Much improved  Continue therapeutic lifestyle modifications  Continue rosuvastatin 40 mg daily  ED (erectile dysfunction)  Continue Cialis PRN  Diagnoses and all orders for this visit:    Hyperlipidemia LDL goal <100  -     Discontinue: rosuvastatin (CRESTOR) 40 MG tablet; Take 1 tablet (40 mg total) by mouth daily at bedtime  -     Lipid Panel with Direct LDL reflex; Future  -     Comprehensive metabolic panel; Future  -     CBC and Platelet; Future  -     rosuvastatin (CRESTOR) 40 MG tablet; Take 1 tablet (40 mg total) by mouth daily at bedtime    Erectile dysfunction, unspecified erectile dysfunction type  -     Discontinue: tadalafil (CIALIS) 5 MG tablet; Take 1 tablet (5 mg total) by mouth daily as needed for erectile dysfunction  -     tadalafil (CIALIS) 5 MG tablet; Take 1 tablet (5 mg total) by mouth daily as needed for erectile dysfunction    Skin lesion of scalp  -     Ambulatory referral to Dermatology; Future    IFG (impaired fasting glucose)  -     HEMOGLOBIN A1C W/ EAG ESTIMATION; Future          Subjective:      Patient ID: Mckayla Stanton is a 60 y o  male  Hyperlipidemia   This is a chronic problem  The current episode started more than 1 year ago  The problem is controlled  He has no history of diabetes or obesity  Pertinent negatives include no chest pain or shortness of breath  Current antihyperlipidemic treatment includes statins   The current treatment provides significant improvement of lipids  There are no compliance problems  Risk factors for coronary artery disease include male sex and dyslipidemia  The following portions of the patient's history were reviewed and updated as appropriate: allergies, current medications, past family history, past medical history, past social history, past surgical history and problem list     Review of Systems   Respiratory: Negative for shortness of breath  Cardiovascular: Negative for chest pain  Objective:      /74 (BP Location: Left arm, Patient Position: Sitting, Cuff Size: Standard)   Pulse 78   Temp 98 6 °F (37 °C) (Tympanic)   Resp 17   Ht 6' (1 829 m)   Wt 86 6 kg (191 lb)   SpO2 98%   BMI 25 90 kg/m²          Physical Exam   Constitutional: He is oriented to person, place, and time  He appears well-developed and well-nourished  He is cooperative  No distress  HENT:   Head: Normocephalic and atraumatic  Right Ear: Hearing and external ear normal    Left Ear: Hearing and external ear normal    Eyes: Conjunctivae and lids are normal    Cardiovascular: Normal rate  Pulmonary/Chest: Effort normal  No respiratory distress  Musculoskeletal: Normal range of motion  Neurological: He is alert and oriented to person, place, and time  Gait normal    Skin: Skin is warm and dry  Psychiatric: He has a normal mood and affect  His speech is normal and behavior is normal    Nursing note and vitals reviewed  BMI Counseling: Body mass index is 25 9 kg/m²  The BMI is above normal  Nutrition recommendations include encouraging healthy choices of fruits and vegetables and limiting drinks that contain sugar  Exercise recommendations include exercising 3-5 times per week            Lab Results   Component Value Date    HEPCAB Non-reactive 01/17/2020     Lab Results   Component Value Date     09/17/2015    SODIUM 138 01/17/2020    K 4 1 01/17/2020     01/17/2020    CO2 29 01/17/2020    ANIONGAP 6 09/17/2015    AGAP 3 (L) 01/17/2020    BUN 14 01/17/2020    CREATININE 1 01 01/17/2020    GLUC 110 11/04/2016    GLUF 100 (H) 01/17/2020    CALCIUM 9 6 01/17/2020    AST 27 01/17/2020    ALT 52 01/17/2020    ALKPHOS 75 01/17/2020    PROT 7 5 09/17/2015    TP 8 2 01/17/2020    BILITOT 0 76 09/17/2015    TBILI 1 22 (H) 01/17/2020    EGFR 80 01/17/2020     Lab Results   Component Value Date    WBC 6 38 07/11/2019    HGB 15 8 07/11/2019    HCT 47 2 07/11/2019    MCV 92 07/11/2019     07/11/2019     Lab Results   Component Value Date    CHOLESTEROL 190 01/17/2020    CHOLESTEROL 208 (H) 07/11/2019    CHOLESTEROL 233 (H) 12/19/2018     Lab Results   Component Value Date    HDL 63 01/17/2020    HDL 50 07/11/2019    HDL 50 12/19/2018     Lab Results   Component Value Date    TRIG 92 01/17/2020    TRIG 71 07/11/2019    TRIG 99 12/19/2018     No results found for: [facility]  Lab Results   Component Value Date    LDLCALC 109 (H) 01/17/2020

## 2020-01-22 NOTE — PATIENT INSTRUCTIONS
Hyperlipidemia   WHAT YOU NEED TO KNOW:   What is hyperlipidemia? Hyperlipidemia is a high level of lipids (fats) in your blood  These lipids include cholesterol or triglycerides  Lipids are made by your body  They also come from the foods you eat  Your body needs lipids to work properly, but high levels increase your risk for heart disease, heart attack, and stroke  What increases my risk for hyperlipidemia? · Family history of high lipid levels    · Diet high in saturated fats, cholesterol, or calories     · High alcohol intake or smoking    · Lack of regular physical activity    · Medical conditions such as hypothyroidism, obesity, or type 2 diabetes    · Certain medicines, such as blood pressure medicines, hormones, and steroids  How is hyperlipidemia managed and treated? Your healthcare provider may first recommend that you make lifestyle changes to help decrease your lipid levels  You may also need to take medicine to lower your lipid levels  Some of the lifestyle changes you may need to make include the following:  · Maintain a healthy weight  Ask your healthcare provider how much you should weigh  Ask him or her to help you create a weight loss plan if you are overweight  Weight loss can decrease your cholesterol and triglyceride levels  · Exercise as directed  Exercise lowers your cholesterol levels and helps you maintain a healthy weight  Get 40 minutes or more of moderate exercise 3 to 4 days each week  You can split your exercise into four 10-minute workouts instead of 40 minutes at one time  Examples of moderate exercises include walking briskly, swimming, or riding a bike  Work with your healthcare provider to plan the best exercise program for you  · Do not smoke  Nicotine and other chemicals in cigarettes and cigars can increase your risk for a heart attack and stroke  Ask your healthcare provider for information if you currently smoke and need help to quit   E-cigarettes or smokeless tobacco still contain nicotine  Talk to your healthcare provider before you use these products  · Eat heart-healthy foods  Talk to your dietitian about a heart-healthy diet  The following will help you manage hyperlipidemia:     ¨ Decrease the total amount of fat you eat  Choose lean meats, fat-free or 1% fat milk, and low-fat dairy products, such as yogurt and cheese  Limit or do not eat red meat  Red meats are high in fat and cholesterol  ¨ Replace unhealthy fats with healthy fats  Unhealthy fats include saturated fat, trans fat, and cholesterol  Choose soft margarines that are low in saturated fat and have little or no trans fat  Monounsaturated fats are healthy fats  These are found in olive oil, canola oil, avocado, and nuts  Polyunsaturated fats are also healthy  These are found in fish, flaxseed, walnuts, and soybeans  ¨ Eat fruits and vegetables every day  They are low in calories and fat and a good source of essential vitamins  Include dark green, red, and orange vegetables  Examples include spinach, kale, broccoli, and carrots  ¨ Eat foods high in fiber  Choose whole grain, high-fiber foods  Good choices include whole-wheat breads or cereals, beans, peas, fruits, and vegetables  · Ask your healthcare provider if it is safe for you to drink alcohol  Alcohol can increase your cholesterol and triglyceride levels  A drink of alcohol is 12 ounces of beer, 5 ounces of wine, or 1½ ounces of liquor    Call 911 for any of the following:   · You have any of the following signs of a heart attack:      ¨ Squeezing, pressure, or pain in your chest that lasts longer than 5 minutes or returns    ¨ Discomfort or pain in your back, neck, jaw, stomach, or arm     ¨ Trouble breathing    ¨ Nausea or vomiting    ¨ Lightheadedness or a sudden cold sweat, especially with chest pain or trouble breathing    · You have any of the following signs of a stroke:      ¨ Numbness or drooping on one side of your face     ¨ Weakness in an arm or leg    ¨ Confusion or difficulty speaking    ¨ Dizziness, a severe headache, or vision loss  When should I contact my healthcare provider? · You have questions or concerns about your condition or care  CARE AGREEMENT:   You have the right to help plan your care  Learn about your health condition and how it may be treated  Discuss treatment options with your caregivers to decide what care you want to receive  You always have the right to refuse treatment  The above information is an  only  It is not intended as medical advice for individual conditions or treatments  Talk to your doctor, nurse or pharmacist before following any medical regimen to see if it is safe and effective for you  © 2017 [facility] [facility] Information is for End User's use only and may not be sold, redistributed or otherwise used for commercial purposes  All illustrations and images included in CareNotes® are the copyrighted property of [employer]  or [employer]

## 2020-01-22 NOTE — ASSESSMENT & PLAN NOTE
Continue to work on diet modification  Fasting glucose on current lab is 100  Will continue to monitor  Will assess HgbA1c prior to next office visit

## 2020-07-20 ENCOUNTER — TRANSCRIBE ORDERS (OUTPATIENT)
Dept: LAB | Facility: CLINIC | Age: 60
End: 2020-07-20

## 2020-07-20 ENCOUNTER — APPOINTMENT (OUTPATIENT)
Dept: LAB | Facility: CLINIC | Age: 60
End: 2020-07-20
Payer: COMMERCIAL

## 2020-07-20 DIAGNOSIS — E78.5 HYPERLIPIDEMIA LDL GOAL <100: ICD-10-CM

## 2020-07-20 DIAGNOSIS — R73.01 IFG (IMPAIRED FASTING GLUCOSE): ICD-10-CM

## 2020-07-20 LAB
ALBUMIN SERPL BCP-MCNC: 4.3 G/DL (ref 3.5–5)
ALP SERPL-CCNC: 69 U/L (ref 46–116)
ALT SERPL W P-5'-P-CCNC: 32 U/L (ref 12–78)
ANION GAP SERPL CALCULATED.3IONS-SCNC: 8 MMOL/L (ref 4–13)
AST SERPL W P-5'-P-CCNC: 22 U/L (ref 5–45)
BILIRUB SERPL-MCNC: 1.33 MG/DL (ref 0.2–1)
BUN SERPL-MCNC: 16 MG/DL (ref 5–25)
CALCIUM ALBUM COR SERPL-MCNC: 10.3 MG/DL (ref 8.3–10.1)
CALCIUM SERPL-MCNC: 10.5 MG/DL (ref 8.3–10.1)
CHLORIDE SERPL-SCNC: 105 MMOL/L (ref 100–108)
CHOLEST SERPL-MCNC: 204 MG/DL (ref 50–200)
CO2 SERPL-SCNC: 25 MMOL/L (ref 21–32)
CREAT SERPL-MCNC: 0.91 MG/DL (ref 0.6–1.3)
ERYTHROCYTE [DISTWIDTH] IN BLOOD BY AUTOMATED COUNT: 12.3 % (ref 11.6–15.1)
EST. AVERAGE GLUCOSE BLD GHB EST-MCNC: 111 MG/DL
GFR SERPL CREATININE-BSD FRML MDRD: 91 ML/MIN/1.73SQ M
GLUCOSE P FAST SERPL-MCNC: 97 MG/DL (ref 65–99)
HBA1C MFR BLD: 5.5 %
HCT VFR BLD AUTO: 50.5 % (ref 36.5–49.3)
HDLC SERPL-MCNC: 51 MG/DL
HGB BLD-MCNC: 17.2 G/DL (ref 12–17)
LDLC SERPL CALC-MCNC: 131 MG/DL (ref 0–100)
MCH RBC QN AUTO: 30.7 PG (ref 26.8–34.3)
MCHC RBC AUTO-ENTMCNC: 34.1 G/DL (ref 31.4–37.4)
MCV RBC AUTO: 90 FL (ref 82–98)
PLATELET # BLD AUTO: 250 THOUSANDS/UL (ref 149–390)
PMV BLD AUTO: 9.1 FL (ref 8.9–12.7)
POTASSIUM SERPL-SCNC: 3.9 MMOL/L (ref 3.5–5.3)
PROT SERPL-MCNC: 8 G/DL (ref 6.4–8.2)
RBC # BLD AUTO: 5.61 MILLION/UL (ref 3.88–5.62)
SODIUM SERPL-SCNC: 138 MMOL/L (ref 136–145)
TRIGL SERPL-MCNC: 112 MG/DL
WBC # BLD AUTO: 6.59 THOUSAND/UL (ref 4.31–10.16)

## 2020-07-20 PROCEDURE — 80053 COMPREHEN METABOLIC PANEL: CPT

## 2020-07-20 PROCEDURE — 85027 COMPLETE CBC AUTOMATED: CPT

## 2020-07-20 PROCEDURE — 80061 LIPID PANEL: CPT

## 2020-07-20 PROCEDURE — 83036 HEMOGLOBIN GLYCOSYLATED A1C: CPT

## 2020-07-20 PROCEDURE — 36415 COLL VENOUS BLD VENIPUNCTURE: CPT

## 2020-07-21 RX ORDER — ROSUVASTATIN CALCIUM 40 MG/1
TABLET, COATED ORAL
Qty: 90 TABLET | Refills: 0 | Status: SHIPPED | OUTPATIENT
Start: 2020-07-21 | End: 2020-07-30 | Stop reason: SDUPTHER

## 2020-07-30 ENCOUNTER — TELEMEDICINE (OUTPATIENT)
Dept: FAMILY MEDICINE CLINIC | Facility: CLINIC | Age: 60
End: 2020-07-30
Payer: COMMERCIAL

## 2020-07-30 VITALS — RESPIRATION RATE: 17 BRPM | BODY MASS INDEX: 25.87 KG/M2 | WEIGHT: 191 LBS | HEIGHT: 72 IN

## 2020-07-30 DIAGNOSIS — E78.5 HYPERLIPIDEMIA LDL GOAL <100: ICD-10-CM

## 2020-07-30 DIAGNOSIS — R73.01 IFG (IMPAIRED FASTING GLUCOSE): Primary | ICD-10-CM

## 2020-07-30 DIAGNOSIS — N52.9 ERECTILE DYSFUNCTION, UNSPECIFIED ERECTILE DYSFUNCTION TYPE: ICD-10-CM

## 2020-07-30 PROCEDURE — 99213 OFFICE O/P EST LOW 20 MIN: CPT | Performed by: FAMILY MEDICINE

## 2020-07-30 PROCEDURE — 3008F BODY MASS INDEX DOCD: CPT | Performed by: FAMILY MEDICINE

## 2020-07-30 RX ORDER — TADALAFIL 5 MG/1
5 TABLET ORAL DAILY PRN
Qty: 60 TABLET | Refills: 1 | Status: SHIPPED | OUTPATIENT
Start: 2020-07-30 | End: 2021-02-10 | Stop reason: SDUPTHER

## 2020-07-30 RX ORDER — ROSUVASTATIN CALCIUM 40 MG/1
40 TABLET, COATED ORAL DAILY
Qty: 90 TABLET | Refills: 1 | Status: SHIPPED | OUTPATIENT
Start: 2020-07-30 | End: 2021-03-23

## 2020-07-30 NOTE — ASSESSMENT & PLAN NOTE
LDL increased from 109 to 131  Patient admits that his diet was not good and he did not exercise over the past few months due to the pandemic  He has been improving his diet and restarting his exercise regimen over the past couple of weeks  Will continue rosuvastatin 40 mg daily  Encouraged patient to work on therapeutic lifestyle changes  Plan to reassess with lab and follow-up in 6 months

## 2020-07-30 NOTE — PROGRESS NOTES
Virtual Regular Visit      Assessment/Plan:    Problem List Items Addressed This Visit        Endocrine    IFG (impaired fasting glucose) - Primary     HgbA1c stable at 5 5%  Continue lifestyle modifications  Relevant Orders    HEMOGLOBIN A1C W/ EAG ESTIMATION    CBC and Platelet       Other    ED (erectile dysfunction)     Stable  Continue Cialis PRN  Relevant Medications    tadalafil (Cialis) 5 MG tablet    Hyperlipidemia LDL goal <100     LDL increased from 109 to 131  Patient admits that his diet was not good and he did not exercise over the past few months due to the pandemic  He has been improving his diet and restarting his exercise regimen over the past couple of weeks  Will continue rosuvastatin 40 mg daily  Encouraged patient to work on therapeutic lifestyle changes  Plan to reassess with lab and follow-up in 6 months  Relevant Medications    rosuvastatin (CRESTOR) 40 MG tablet    Other Relevant Orders    Lipid Panel with Direct LDL reflex    Comprehensive metabolic panel               Reason for visit is   Chief Complaint   Patient presents with    Follow-up    Virtual Regular Visit        Encounter provider Syed Ceja MD    Provider located at [address] [place] [id number]      Recent Visits  No visits were found meeting these conditions  Showing recent visits within past 7 days and meeting all other requirements     Today's Visits  Date Type Provider Dept   07/30/20 Telemedicine Syed Ceja, [address] [place] [address] today's visits and meeting all other requirements     Future Appointments  No visits were found meeting these conditions  Showing future appointments within next 150 days and meeting all other requirements        The patient was identified by name and date of birth   Shaye Collet was informed that this is a telemedicine visit and that the visit is being conducted through [employer]  My office door was closed  No one else was in the room  He acknowledged consent and understanding of privacy and security of the video platform  The patient has agreed to participate and understands they can discontinue the visit at any time  Patient is aware this is a billable service  Javier Lozada is a 63 y o  male Patient presents for a follow-up visit  Patient completed the labs ordered, and is here to review results  All results are reviewed with patient, and all questions were answered  Patient has no new complaints today  HPI     Past Medical History:   Diagnosis Date    Sebaceous cyst     last assessed: Aug 30, 2015 - in the back        Past Surgical History:   Procedure Laterality Date    NO PAST SURGERIES         Current Outpatient Medications   Medication Sig Dispense Refill    omega-3-acid ethyl esters (LOVAZA) 1 g capsule Take 2 capsules by mouth 2 (two) times a day      rosuvastatin (CRESTOR) 40 MG tablet Take 1 tablet (40 mg total) by mouth daily 90 tablet 1    tadalafil (Cialis) 5 MG tablet Take 1 tablet (5 mg total) by mouth daily as needed for erectile dysfunction 60 tablet 1     No current facility-administered medications for this visit  No Known Allergies    Review of Systems   Respiratory: Negative for shortness of breath  Cardiovascular: Negative for chest pain  Video Exam    Vitals:    07/30/20 1047   Resp: 17   Weight: 86 6 kg (191 lb)   Height: 6' (1 829 m)       Physical Exam   Constitutional: He is oriented to person, place, and time  He appears well-developed and well-nourished  He is cooperative  HENT:   Head: Normocephalic and atraumatic  Right Ear: Hearing and external ear normal    Left Ear: Hearing and external ear normal    Eyes: Conjunctivae and lids are normal    Pulmonary/Chest: Effort normal  No respiratory distress  Neurological: He is alert and oriented to person, place, and time  Psychiatric: He has a normal mood and affect  His speech is normal       Lab Results   Component Value Date    HGBA1C 5 5 07/20/2020     Lab Results   Component Value Date    WBC 6 59 07/20/2020    HGB 17 2 (H) 07/20/2020    HCT 50 5 (H) 07/20/2020    MCV 90 07/20/2020     07/20/2020     Lab Results   Component Value Date     09/17/2015    SODIUM 138 07/20/2020    K 3 9 07/20/2020     07/20/2020    CO2 25 07/20/2020    ANIONGAP 6 09/17/2015    AGAP 8 07/20/2020    BUN 16 07/20/2020    CREATININE 0 91 07/20/2020    GLUC 110 11/04/2016    GLUF 97 07/20/2020    CALCIUM 10 5 (H) 07/20/2020    AST 22 07/20/2020    ALT 32 07/20/2020    ALKPHOS 69 07/20/2020    PROT 7 5 09/17/2015    TP 8 0 07/20/2020    BILITOT 0 76 09/17/2015    TBILI 1 33 (H) 07/20/2020    EGFR 91 07/20/2020     Lab Results   Component Value Date    CHOLESTEROL 204 (H) 07/20/2020    CHOLESTEROL 190 01/17/2020    CHOLESTEROL 208 (H) 07/11/2019     Lab Results   Component Value Date    HDL 51 07/20/2020    HDL 63 01/17/2020    HDL 50 07/11/2019     Lab Results   Component Value Date    TRIG 112 07/20/2020    TRIG 92 01/17/2020    TRIG 71 07/11/2019     No results found for: [facility]  Lab Results   Component Value Date    LDLCALC 131 (H) 07/20/2020           I spent 25 minutes directly with the patient during this visit      VIRTUAL VISIT DISCLAIMER    Jag Farnsworth acknowledges that he has consented to an online visit or consultation  He understands that the online visit is based solely on information provided by him, and that, in the absence of a face-to-face physical evaluation by the physician, the diagnosis he receives is both limited and provisional in terms of accuracy and completeness  This is not intended to replace a full medical face-to-face evaluation by the physician  Jag Farnsworth understands and accepts these terms

## 2021-01-21 ENCOUNTER — LAB (OUTPATIENT)
Dept: LAB | Facility: CLINIC | Age: 61
End: 2021-01-21
Payer: COMMERCIAL

## 2021-01-21 DIAGNOSIS — E78.5 HYPERLIPIDEMIA LDL GOAL <100: ICD-10-CM

## 2021-01-21 DIAGNOSIS — R73.01 IFG (IMPAIRED FASTING GLUCOSE): ICD-10-CM

## 2021-01-21 LAB
ALBUMIN SERPL BCP-MCNC: 4 G/DL (ref 3.5–5)
ALP SERPL-CCNC: 81 U/L (ref 46–116)
ALT SERPL W P-5'-P-CCNC: 33 U/L (ref 12–78)
ANION GAP SERPL CALCULATED.3IONS-SCNC: 5 MMOL/L (ref 4–13)
AST SERPL W P-5'-P-CCNC: 19 U/L (ref 5–45)
BILIRUB SERPL-MCNC: 1.21 MG/DL (ref 0.2–1)
BUN SERPL-MCNC: 15 MG/DL (ref 5–25)
CALCIUM SERPL-MCNC: 9.6 MG/DL (ref 8.3–10.1)
CHLORIDE SERPL-SCNC: 107 MMOL/L (ref 100–108)
CHOLEST SERPL-MCNC: 201 MG/DL (ref 50–200)
CO2 SERPL-SCNC: 28 MMOL/L (ref 21–32)
CREAT SERPL-MCNC: 0.97 MG/DL (ref 0.6–1.3)
ERYTHROCYTE [DISTWIDTH] IN BLOOD BY AUTOMATED COUNT: 12.7 % (ref 11.6–15.1)
EST. AVERAGE GLUCOSE BLD GHB EST-MCNC: 108 MG/DL
GFR SERPL CREATININE-BSD FRML MDRD: 84 ML/MIN/1.73SQ M
GLUCOSE P FAST SERPL-MCNC: 98 MG/DL (ref 65–99)
HBA1C MFR BLD: 5.4 %
HCT VFR BLD AUTO: 50 % (ref 36.5–49.3)
HDLC SERPL-MCNC: 53 MG/DL
HGB BLD-MCNC: 17 G/DL (ref 12–17)
LDLC SERPL CALC-MCNC: 122 MG/DL (ref 0–100)
MCH RBC QN AUTO: 30.7 PG (ref 26.8–34.3)
MCHC RBC AUTO-ENTMCNC: 34 G/DL (ref 31.4–37.4)
MCV RBC AUTO: 90 FL (ref 82–98)
PLATELET # BLD AUTO: 244 THOUSANDS/UL (ref 149–390)
PMV BLD AUTO: 9.1 FL (ref 8.9–12.7)
POTASSIUM SERPL-SCNC: 3.7 MMOL/L (ref 3.5–5.3)
PROT SERPL-MCNC: 8.1 G/DL (ref 6.4–8.2)
RBC # BLD AUTO: 5.53 MILLION/UL (ref 3.88–5.62)
SODIUM SERPL-SCNC: 140 MMOL/L (ref 136–145)
TRIGL SERPL-MCNC: 129 MG/DL
WBC # BLD AUTO: 7.01 THOUSAND/UL (ref 4.31–10.16)

## 2021-01-21 PROCEDURE — 80053 COMPREHEN METABOLIC PANEL: CPT

## 2021-01-21 PROCEDURE — 36415 COLL VENOUS BLD VENIPUNCTURE: CPT

## 2021-01-21 PROCEDURE — 83036 HEMOGLOBIN GLYCOSYLATED A1C: CPT

## 2021-01-21 PROCEDURE — 80061 LIPID PANEL: CPT

## 2021-01-21 PROCEDURE — 85027 COMPLETE CBC AUTOMATED: CPT

## 2021-02-10 ENCOUNTER — TELEMEDICINE (OUTPATIENT)
Dept: FAMILY MEDICINE CLINIC | Facility: CLINIC | Age: 61
End: 2021-02-10
Payer: COMMERCIAL

## 2021-02-10 VITALS — HEIGHT: 72 IN | BODY MASS INDEX: 26.82 KG/M2 | WEIGHT: 198 LBS

## 2021-02-10 DIAGNOSIS — Z00.00 ANNUAL PHYSICAL EXAM: Primary | ICD-10-CM

## 2021-02-10 DIAGNOSIS — Z13.1 SCREENING FOR DIABETES MELLITUS (DM): ICD-10-CM

## 2021-02-10 DIAGNOSIS — N52.9 ERECTILE DYSFUNCTION, UNSPECIFIED ERECTILE DYSFUNCTION TYPE: ICD-10-CM

## 2021-02-10 DIAGNOSIS — R79.89 ABNORMAL CBC: ICD-10-CM

## 2021-02-10 DIAGNOSIS — E78.5 HYPERLIPIDEMIA LDL GOAL <100: ICD-10-CM

## 2021-02-10 PROCEDURE — 1036F TOBACCO NON-USER: CPT | Performed by: FAMILY MEDICINE

## 2021-02-10 PROCEDURE — 99396 PREV VISIT EST AGE 40-64: CPT | Performed by: FAMILY MEDICINE

## 2021-02-10 PROCEDURE — 3008F BODY MASS INDEX DOCD: CPT | Performed by: FAMILY MEDICINE

## 2021-02-10 RX ORDER — TADALAFIL 5 MG/1
5 TABLET ORAL DAILY PRN
Qty: 60 TABLET | Refills: 1 | Status: SHIPPED | OUTPATIENT
Start: 2021-02-10

## 2021-02-10 NOTE — PROGRESS NOTES
Teams virtual visit      [facility]    NAME: Charles Rodriguez  AGE: 63 y o  SEX: male  : 1960     DATE: 2/10/2021     Assessment and Plan:     Problem List Items Addressed This Visit        Other    Hyperlipidemia LDL goal <100     Most likely familial   Has been on a statin since 2018  Lipid panel well controlled  Continue crestor 40mg daily  Relevant Orders    Lipid panel    Annual physical exam - Primary     · Declined screening prostate cancer  · cologuard neg 2019   · Recheck lipid and fasting sugar in 1 year  · Fu in 1 year           Other Visit Diagnoses     Screening for diabetes mellitus (DM)        Relevant Orders    Comprehensive metabolic panel    Abnormal CBC        Relevant Orders    CBC and differential          Immunizations and preventive care screenings were discussed with patient today  Appropriate education was printed on patient's after visit summary  Counseling:  Alcohol/drug use: discussed moderation in alcohol intake, the recommendations for healthy alcohol use, and avoidance of illicit drug use  Dental Health: discussed importance of regular tooth brushing, flossing, and dental visits  Sexual health: discussed sexually transmitted diseases, partner selection, use of condoms, avoidance of unintended pregnancy, and contraceptive alternatives  · Exercise: the importance of regular exercise/physical activity was discussed  Recommend exercise 3-5 times per week for at least 30 minutes  Return in about 1 year (around 2/10/2022) for Annual physical      Chief Complaint:     Chief Complaint   Patient presents with    Follow-up     6 month     Virtual Regular Visit      History of Present Illness:     Adult Annual Physical   Patient here for a comprehensive physical exam  The patient reports no problems  Diet and Physical Activity  · Diet/Nutrition: well balanced diet  · Exercise: moderate cardiovascular exercise  Depression Screening  PHQ-9 Depression Screening    PHQ-9:   Frequency of the following problems over the past two weeks:           General Health  · Sleep: sleeps well  · Hearing: no issues  · Vision: no vision problems  · Dental: regular dental visits   Health  · History of STDs?: no   · Declined screening prostate cancer  · cologuard neg 2/2019      Review of Systems:     Review of Systems   Constitutional: Negative for activity change, appetite change, fever and unexpected weight change  HENT: Negative for ear pain, postnasal drip and rhinorrhea  Eyes: Negative for photophobia and pain  Respiratory: Negative for cough, shortness of breath and wheezing  Cardiovascular: Negative for chest pain, palpitations and leg swelling  Gastrointestinal: Negative for abdominal pain, blood in stool, nausea and vomiting  Endocrine: Negative for polydipsia and polyuria  Genitourinary: Negative for difficulty urinating, hematuria and urgency  Musculoskeletal: Negative for myalgias  Skin: Negative for rash  Neurological: Negative for dizziness  Psychiatric/Behavioral: Negative for confusion and sleep disturbance        Past Medical History:     Past Medical History:   Diagnosis Date    Sebaceous cyst     last assessed: Aug 30, 2015 - in the back       Past Surgical History:     Past Surgical History:   Procedure Laterality Date    NO PAST SURGERIES        Social History:        Social History     Socioeconomic History    Marital status: Single     Spouse name: None    Number of children: None    Years of education: None    Highest education level: None   Occupational History    Occupation: Teacher    Social Needs    Financial resource strain: Not hard at all   [place] insecurity     Worry: Never true     Inability: Never true    Transportation needs     Medical: No     Non-medical: No   Tobacco Use    Smoking status: Never Smoker    Smokeless tobacco: Never Used   Substance and Sexual Activity    Alcohol use: Yes     Frequency: Monthly or less     Drinks per session: 3 or 4     Binge frequency: Less than monthly    Drug use: Never    Sexual activity: Not Currently   Lifestyle    Physical activity     Days per week: 0 days     Minutes per session: 0 min    Stress: To some extent   Relationships    Social connections     Talks on phone: Patient refused     Gets together: Patient refused     Attends Alevism service: Patient refused     Active member of club or organization: Patient refused     Attends meetings of clubs or organizations: Patient refused     Relationship status: Patient refused    Intimate partner violence     Fear of current or ex partner: Patient refused     Emotionally abused: Patient refused     Physically abused: Patient refused     Forced sexual activity: Patient refused   Other Topics Concern    None   Social History Narrative    Caffeine use       Family History:     Family History   Problem Relation Age of Onset    Hyperlipidemia Mother     Lung cancer Father     No Known Problems Sister     No Known Problems Brother     No Known Problems Maternal Grandmother     Diabetes Maternal Grandfather         mellitus     Hypertension Maternal Grandfather     No Known Problems Paternal Grandmother     No Known Problems Paternal Grandfather       Current Medications:     Current Outpatient Medications   Medication Sig Dispense Refill    omega-3-acid ethyl esters (LOVAZA) 1 g capsule Take 2 capsules by mouth 2 (two) times a day      rosuvastatin (CRESTOR) 40 MG tablet Take 1 tablet (40 mg total) by mouth daily 90 tablet 1    tadalafil (Cialis) 5 MG tablet Take 1 tablet (5 mg total) by mouth daily as needed for erectile dysfunction 60 tablet 1     No current facility-administered medications for this visit         Allergies:     No Known Allergies   Physical Exam:     Ht 6' (1 829 m)   Wt 89 8 kg (198 lb)   BMI 26 85 kg/m²     Physical Exam  Constitutional:       General: He is not in acute distress  Appearance: He is well-developed  He is not diaphoretic  HENT:      Head: Normocephalic and atraumatic  Right Ear: External ear normal       Left Ear: External ear normal    Eyes:      Conjunctiva/sclera: Conjunctivae normal    Pulmonary:      Effort: Pulmonary effort is normal  No respiratory distress  Neurological:      Mental Status: He is alert and oriented to person, place, and time            Edward Burnette MD   [address] [place] [address]

## 2021-02-10 NOTE — ASSESSMENT & PLAN NOTE
· Declined screening prostate cancer  · fiordaliza neg 2/2019   · Recheck lipid and fasting sugar in 1 year  · Fu in 1 year

## 2021-02-10 NOTE — ASSESSMENT & PLAN NOTE
Most likely familial   Has been on a statin since 2018  Lipid panel well controlled  Continue crestor 40mg daily

## 2021-03-17 DIAGNOSIS — E78.5 HYPERLIPIDEMIA LDL GOAL <100: ICD-10-CM

## 2021-03-23 RX ORDER — ROSUVASTATIN CALCIUM 40 MG/1
TABLET, COATED ORAL
Qty: 90 TABLET | Refills: 3 | Status: SHIPPED | OUTPATIENT
Start: 2021-03-23 | End: 2022-03-14

## 2021-04-09 DIAGNOSIS — Z23 ENCOUNTER FOR IMMUNIZATION: ICD-10-CM

## 2021-05-17 NOTE — PROGRESS NOTES
BMI Counseling: Body mass index is 26 85 kg/m²  The BMI is above normal  Nutrition recommendations include reducing portion sizes, decreasing overall calorie intake, 3-5 servings of fruits/vegetables daily and reducing fast food intake

## 2021-10-25 ENCOUNTER — VBI (OUTPATIENT)
Dept: ADMINISTRATIVE | Facility: OTHER | Age: 61
End: 2021-10-25

## 2022-02-02 ENCOUNTER — APPOINTMENT (OUTPATIENT)
Dept: LAB | Facility: CLINIC | Age: 62
End: 2022-02-02
Payer: COMMERCIAL

## 2022-02-02 DIAGNOSIS — E78.5 HYPERLIPIDEMIA LDL GOAL <100: ICD-10-CM

## 2022-02-02 DIAGNOSIS — R79.89 ABNORMAL CBC: ICD-10-CM

## 2022-02-02 LAB
BASOPHILS # BLD AUTO: 0.04 THOUSANDS/ΜL (ref 0–0.1)
BASOPHILS NFR BLD AUTO: 1 % (ref 0–1)
CHOLEST SERPL-MCNC: 212 MG/DL
EOSINOPHIL # BLD AUTO: 0.3 THOUSAND/ΜL (ref 0–0.61)
EOSINOPHIL NFR BLD AUTO: 4 % (ref 0–6)
ERYTHROCYTE [DISTWIDTH] IN BLOOD BY AUTOMATED COUNT: 12.3 % (ref 11.6–15.1)
HCT VFR BLD AUTO: 51.4 % (ref 36.5–49.3)
HDLC SERPL-MCNC: 56 MG/DL
HGB BLD-MCNC: 17.4 G/DL (ref 12–17)
IMM GRANULOCYTES # BLD AUTO: 0.02 THOUSAND/UL (ref 0–0.2)
IMM GRANULOCYTES NFR BLD AUTO: 0 % (ref 0–2)
LDLC SERPL CALC-MCNC: 136 MG/DL (ref 0–100)
LYMPHOCYTES # BLD AUTO: 1.68 THOUSANDS/ΜL (ref 0.6–4.47)
LYMPHOCYTES NFR BLD AUTO: 25 % (ref 14–44)
MCH RBC QN AUTO: 30.9 PG (ref 26.8–34.3)
MCHC RBC AUTO-ENTMCNC: 33.9 G/DL (ref 31.4–37.4)
MCV RBC AUTO: 91 FL (ref 82–98)
MONOCYTES # BLD AUTO: 0.6 THOUSAND/ΜL (ref 0.17–1.22)
MONOCYTES NFR BLD AUTO: 9 % (ref 4–12)
NEUTROPHILS # BLD AUTO: 4.19 THOUSANDS/ΜL (ref 1.85–7.62)
NEUTS SEG NFR BLD AUTO: 61 % (ref 43–75)
NONHDLC SERPL-MCNC: 156 MG/DL
NRBC BLD AUTO-RTO: 0 /100 WBCS
PLATELET # BLD AUTO: 241 THOUSANDS/UL (ref 149–390)
PMV BLD AUTO: 9.4 FL (ref 8.9–12.7)
RBC # BLD AUTO: 5.64 MILLION/UL (ref 3.88–5.62)
TRIGL SERPL-MCNC: 101 MG/DL
WBC # BLD AUTO: 6.83 THOUSAND/UL (ref 4.31–10.16)

## 2022-02-02 PROCEDURE — 80061 LIPID PANEL: CPT

## 2022-02-02 PROCEDURE — 36415 COLL VENOUS BLD VENIPUNCTURE: CPT

## 2022-02-02 PROCEDURE — 85025 COMPLETE CBC W/AUTO DIFF WBC: CPT

## 2022-03-02 ENCOUNTER — OFFICE VISIT (OUTPATIENT)
Dept: FAMILY MEDICINE CLINIC | Facility: CLINIC | Age: 62
End: 2022-03-02
Payer: COMMERCIAL

## 2022-03-02 VITALS
RESPIRATION RATE: 16 BRPM | SYSTOLIC BLOOD PRESSURE: 130 MMHG | OXYGEN SATURATION: 96 % | BODY MASS INDEX: 25.18 KG/M2 | HEART RATE: 84 BPM | DIASTOLIC BLOOD PRESSURE: 86 MMHG | WEIGHT: 196.2 LBS | HEIGHT: 74 IN | TEMPERATURE: 97.6 F

## 2022-03-02 DIAGNOSIS — R73.01 IFG (IMPAIRED FASTING GLUCOSE): ICD-10-CM

## 2022-03-02 DIAGNOSIS — Z12.11 COLON CANCER SCREENING: ICD-10-CM

## 2022-03-02 DIAGNOSIS — E78.5 HYPERLIPIDEMIA LDL GOAL <100: ICD-10-CM

## 2022-03-02 DIAGNOSIS — Z00.00 ANNUAL PHYSICAL EXAM: ICD-10-CM

## 2022-03-02 DIAGNOSIS — D58.2 ELEVATED HEMOGLOBIN (HCC): ICD-10-CM

## 2022-03-02 DIAGNOSIS — Z12.11 SCREENING FOR COLON CANCER: Primary | ICD-10-CM

## 2022-03-02 PROCEDURE — 3008F BODY MASS INDEX DOCD: CPT | Performed by: FAMILY MEDICINE

## 2022-03-02 PROCEDURE — 1036F TOBACCO NON-USER: CPT | Performed by: FAMILY MEDICINE

## 2022-03-02 PROCEDURE — 99396 PREV VISIT EST AGE 40-64: CPT | Performed by: FAMILY MEDICINE

## 2022-03-02 PROCEDURE — 3725F SCREEN DEPRESSION PERFORMED: CPT | Performed by: FAMILY MEDICINE

## 2022-03-02 RX ORDER — ASPIRIN 81 MG/1
81 TABLET ORAL DAILY
COMMUNITY

## 2022-03-02 NOTE — ASSESSMENT & PLAN NOTE
Most likely familial   Has been on a statin since 2018  Lipid panel well controlled  Continue crestor 40mg daily  head/b/l hands and feet

## 2022-03-02 NOTE — PROGRESS NOTES
[facility]    NAME: Kevin Lozada  AGE: 57 y o  SEX: male  : 1960     DATE: 3/2/2022     Assessment and Plan:     Problem List Items Addressed This Visit        Endocrine    IFG (impaired fasting glucose)    Relevant Orders    Comprehensive metabolic panel       Other    Hyperlipidemia LDL goal <100     Most likely familial   Has been on a statin since   Lipid panel well controlled  Continue crestor 40mg daily  Relevant Orders    Lipid panel    Annual physical exam     · Declined screening prostate cancer  · cologuard neg 2019, repeat   · Recheck lipid and fasting sugar in 1 year  · Fu in 1 year           Other Visit Diagnoses     Screening for colon cancer    -  Primary    Elevated hemoglobin ([facility] [address] )        Relevant Orders    CBC and Platelet    Colon cancer screening        Relevant Orders    Cologuard          Immunizations and preventive care screenings were discussed with patient today  Appropriate education was printed on patient's after visit summary  Flu shot UTD  Counseling:  Alcohol/drug use: discussed moderation in alcohol intake, the recommendations for healthy alcohol use, and avoidance of illicit drug use  Dental Health: discussed importance of regular tooth brushing, flossing, and dental visits  · Exercise: the importance of regular exercise/physical activity was discussed  Recommend exercise 3-5 times per week for at least 30 minutes  Return in 1 year (on 3/2/2023)  Chief Complaint:     Chief Complaint   Patient presents with    Follow-up      History of Present Illness:     Adult Annual Physical   Patient here for a comprehensive physical exam  The patient reports no problems  Diet and Physical Activity  · Diet/Nutrition: well balanced diet  · Exercise: no formal exercise        Depression Screening  PHQ-2/9 Depression Screening    Little interest or pleasure in doing things: 0 - not at all  Feeling down, depressed, or hopeless: 0 - not at all  PHQ-2 Score: 0  PHQ-2 Interpretation: Negative depression screen       General Health  · Sleep: sleeps well  · Hearing: no issues  · Vision: no vision problems  · Dental: regular dental visits   Health  · Symptoms include: none     Review of Systems:     Review of Systems   Constitutional: Negative for activity change, appetite change, fever and unexpected weight change  HENT: Negative for ear pain, postnasal drip and rhinorrhea  Eyes: Negative for photophobia and pain  Respiratory: Negative for cough, shortness of breath and wheezing  Cardiovascular: Negative for chest pain, palpitations and leg swelling  Gastrointestinal: Negative for abdominal pain, blood in stool, nausea and vomiting  Endocrine: Negative for polydipsia and polyuria  Genitourinary: Negative for difficulty urinating, hematuria and urgency  Musculoskeletal: Negative for myalgias  Skin: Negative for rash  Neurological: Negative for dizziness  Psychiatric/Behavioral: Negative for confusion and sleep disturbance        Past Medical History:     Past Medical History:   Diagnosis Date    Sebaceous cyst     last assessed: Aug 30, 2015 - in the back       Past Surgical History:     Past Surgical History:   Procedure Laterality Date    NO PAST SURGERIES        Family History:     Family History   Problem Relation Age of Onset    Hyperlipidemia Mother     Lung cancer Father     No Known Problems Sister     No Known Problems Brother     No Known Problems Maternal Grandmother     Diabetes Maternal Grandfather         mellitus     Hypertension Maternal Grandfather     No Known Problems Paternal Grandmother     No Known Problems Paternal Grandfather       Social History:     Social History     Socioeconomic History    Marital status: Single     Spouse name: None    Number of children: None    Years of education: None    Highest education level: None   Occupational History    Occupation: Teacher    Tobacco Use    Smoking status: Never Smoker    Smokeless tobacco: Never Used   Substance and Sexual Activity    Alcohol use: Yes    Drug use: Never    Sexual activity: Not Currently   Other Topics Concern    None   Social History Narrative    Caffeine use      Social Determinants of Health     Financial Resource Strain: Not on file   Food Insecurity: Not on file   Transportation Needs: Not on file   Physical Activity: Not on file   Stress: Not on file   Social Connections: Not on file   Intimate Partner Violence: Not on file   Housing Stability: Not on file      Current Medications:     Current Outpatient Medications   Medication Sig Dispense Refill    aspirin (ECOTRIN LOW STRENGTH) 81 mg EC tablet Take 81 mg by mouth daily      omega-3-acid ethyl esters (LOVAZA) 1 g capsule Take 2 capsules by mouth 2 (two) times a day      rosuvastatin (CRESTOR) 40 MG tablet TAKE 1 TABLET DAILY 90 tablet 3    tadalafil (Cialis) 5 MG tablet Take 1 tablet (5 mg total) by mouth daily as needed for erectile dysfunction 60 tablet 1     No current facility-administered medications for this visit  Allergies:     No Known Allergies   Physical Exam:     /86 (BP Location: Left arm, Patient Position: Sitting, Cuff Size: Adult)   Pulse 84   Temp 97 6 °F (36 4 °C) (Tympanic)   Resp 16   Ht 6' 2" (1 88 m)   Wt 89 kg (196 lb 3 2 oz)   SpO2 96%   BMI 25 19 kg/m²     Physical Exam  Constitutional:       General: He is not in acute distress  Appearance: He is well-developed  HENT:      Head: Normocephalic and atraumatic  Eyes:      General: No scleral icterus  Conjunctiva/sclera: Conjunctivae normal       Pupils: Pupils are equal, round, and reactive to light  Cardiovascular:      Rate and Rhythm: Normal rate and regular rhythm  Heart sounds: Normal heart sounds  No murmur heard  No friction rub  No gallop      Pulmonary: Effort: Pulmonary effort is normal  No respiratory distress  Breath sounds: Normal breath sounds  No wheezing or rales  Abdominal:      General: Bowel sounds are normal  There is no distension  Palpations: Abdomen is soft  There is no mass  Tenderness: There is no abdominal tenderness  There is no guarding  Musculoskeletal:         General: No tenderness  Cervical back: Normal range of motion  Skin:     General: Skin is warm and dry  Findings: No rash  Neurological:      Mental Status: He is alert and oriented to person, place, and time  Cranial Nerves: No cranial nerve deficit  Motor: No abnormal muscle tone            Toña Hair MD  [address] [place] [address]

## 2022-03-02 NOTE — ASSESSMENT & PLAN NOTE
· Declined screening prostate cancer  · cologuard neg 2/2019, repeat   · Recheck lipid and fasting sugar in 1 year  · Fu in 1 year

## 2022-03-02 NOTE — PATIENT INSTRUCTIONS

## 2022-03-14 DIAGNOSIS — E78.5 HYPERLIPIDEMIA LDL GOAL <100: ICD-10-CM

## 2022-03-14 RX ORDER — ROSUVASTATIN CALCIUM 40 MG/1
TABLET, COATED ORAL
Qty: 90 TABLET | Refills: 3 | Status: SHIPPED | OUTPATIENT
Start: 2022-03-14

## 2022-03-22 LAB — COLOGUARD RESULT REPORTABLE: NEGATIVE

## 2022-04-01 ENCOUNTER — TELEPHONE (OUTPATIENT)
Dept: INTERNAL MEDICINE CLINIC | Facility: CLINIC | Age: 62
End: 2022-04-01

## 2022-08-22 ENCOUNTER — NURSE TRIAGE (OUTPATIENT)
Dept: OTHER | Facility: OTHER | Age: 62
End: 2022-08-22

## 2022-08-22 NOTE — TELEPHONE ENCOUNTER
Regarding: COVID-[facility]-Positive- care advice/med request  ----- Message from Madi Ko sent at 8/22/2022  6:47 PM EDT -----  "I tested positive for COVID   I would like to know if I could get paxlovid and how long I should quarantine "

## 2022-08-23 ENCOUNTER — TELEMEDICINE (OUTPATIENT)
Dept: FAMILY MEDICINE CLINIC | Facility: CLINIC | Age: 62
End: 2022-08-23
Payer: COMMERCIAL

## 2022-08-23 DIAGNOSIS — U07.1 COVID-19: Primary | ICD-10-CM

## 2022-08-23 PROCEDURE — 99213 OFFICE O/P EST LOW 20 MIN: CPT | Performed by: FAMILY MEDICINE

## 2022-08-23 RX ORDER — NIRMATRELVIR AND RITONAVIR 300-100 MG
3 KIT ORAL 2 TIMES DAILY
Qty: 30 TABLET | Refills: 0 | Status: SHIPPED | OUTPATIENT
Start: 2022-08-23 | End: 2022-08-28

## 2022-08-23 NOTE — ASSESSMENT & PLAN NOTE
Will start paxlovid  Discuss side effects  Advised patient to start Flonase 1 spray in each nostril daily as needed, if not helping, may increase to 2 sprays  Start OTC antihistamine such as Allegra, Claritin or Zyrtec daily for congestion  Start tea with honey as honey is a natural cough suppressant  Take Tylenol extra-strength 2 tablets every 8 hours as needed for discomfort or fever  Start taking vitamin-C, vitamin-D, and zinc daily  Hydrate and eat as tolerated    Follow-up as needed

## 2022-08-23 NOTE — TELEPHONE ENCOUNTER
Reason for Disposition   [1] COVID-19 diagnosed by positive lab test (e g , PCR, rapid self-test kit) AND [2] NO symptoms (e g , cough, fever, others)    Answer Assessment - Initial Assessment Questions  1  COVID-19 DIAGNOSIS: "Who made your COVID-19 diagnosis?" "Was it confirmed by a positive lab test or self-test?" If not diagnosed by a doctor (or NP/PA), ask "Are there lots of cases (community spread) where you live?" Note: See public health department website, if unsure  Tested positive today with home test  2  COVID-19 EXPOSURE: "Was there any known exposure to COVID before the symptoms began?" CDC Definition of close contact: within 6 feet (2 meters) for a total of 15 minutes or more over a 24-hour period  Went to a party over the weekend  3  ONSET: "When did the COVID-19 symptoms start?"       Started over the weekend, Sat or Sunday  4  WORST SYMPTOM: "What is your worst symptom?" (e g , cough, fever, shortness of breath, muscle aches)      Congestion, headache  5  COUGH: "Do you have a cough?" If Yes, ask: "How bad is the cough?"        Mild cough/sneezing  6  FEVER: "Do you have a fever?" If Yes, ask: "What is your temperature, how was it measured, and when did it start?"      Denies fever, but didn't take  7  RESPIRATORY STATUS: "Describe your breathing?" (e g , shortness of breath, wheezing, unable to speak)       Denies SOB or chest discomfort  8  BETTER-SAME-WORSE: "Are you getting better, staying the same or getting worse compared to yesterday?"  If getting worse, ask, "In what way?"      same  9  HIGH RISK DISEASE: "Do you have any chronic medical problems?" (e g , asthma, heart or lung disease, weak immune system, obesity, etc )      denies  10  VACCINE: "Have you had the COVID-19 vaccine?" If Yes, ask: "Which one, how many shots, when did you get it?"        J&J  11   BOOSTER: "Have you received your COVID-19 booster?" If Yes, ask: "Which one and when did you get it?"        Booster J&J  12  PREGNANCY: "Is there any chance you are pregnant?" "When was your last menstrual period?"        N/A  13  OTHER SYMPTOMS: "Do you have any other symptoms?"  (e g , chills, fatigue, headache, loss of smell or taste, muscle pain, sore throat)        See above  14   O2 SATURATION MONITOR:  "Do you use an oxygen saturation monitor (pulse oximeter) at home?" If Yes, ask "What is your reading (oxygen level) today?" "What is your usual oxygen saturation reading?" (e g , 95%)        N/A    Protocols used: CORONAVIRUS (COVID-19) DIAGNOSED OR SUSPECTED-ADULT-

## 2022-08-23 NOTE — PROGRESS NOTES
COVID-19 Outpatient Progress Note    Assessment/Plan:    Problem List Items Addressed This Visit        Other    COVID-19 - Primary     Will start paxlovid  Discuss side effects  Advised patient to start Flonase 1 spray in each nostril daily as needed, if not helping, may increase to 2 sprays  Start OTC antihistamine such as Allegra, Claritin or Zyrtec daily for congestion  Start tea with honey as honey is a natural cough suppressant  Take Tylenol extra-strength 2 tablets every 8 hours as needed for discomfort or fever  Start taking vitamin-C, vitamin-D, and zinc daily  Hydrate and eat as tolerated  Follow-up as needed           Relevant Medications    nirmatrelvir & ritonavir (Paxlovid, 300/100,) tablet therapy pack         Disposition:     Discussed symptom directed medication options with patient  Discussed vitamin D, vitamin C, and/or zinc supplementation with patient  I have spent 15 minutes directly with the patient  Greater than 50% of this time was spent in counseling/coordination of care regarding: instructions for management, patient and family education, importance of treatment compliance and risk factor reductions  Encounter provider: Mp Chicas DO     Provider located at: [address] [place] [address]     Recent Visits  No visits were found meeting these conditions  Showing recent visits within past 7 days and meeting all other requirements  Today's Visits  Date Type Provider Dept   08/23/22 Telemedicine Chandrakant Shirley , [address] [place] [address] today's visits and meeting all other requirements  Future Appointments  No visits were found meeting these conditions  Showing future appointments within next 150 days and meeting all other requirements     This virtual check-in was done via [facility] and patient was informed that this is a secure, HIPAA-compliant platform   He agrees to proceed  Patient agrees to participate in a virtual check in via telephone or video visit instead of presenting to the office to address urgent/immediate medical needs  Patient is aware this is a billable service  He acknowledged consent and understanding of privacy and security of the video platform  The patient has agreed to participate and understands they can discontinue the visit at any time  After connecting through [facility], the patient was identified by name and date of birth  Vic Zamudio was informed that this was a telemedicine visit and that the exam was being conducted confidentially over secure lines  My office door was closed  No one else was in the room  Vic Zamudio acknowledged consent and understanding of privacy and security of the telemedicine visit  I informed the patient that I have reviewed his record in Epic and presented the opportunity for him to ask any questions regarding the visit today  The patient agreed to participate  Verification of patient location:  Patient is located in the following state in which I hold an active license: [place]    Subjective:   Vic Zamudio is a 60 y o  male who has been screened for COVID-19  Symptom change since last report: unchanged  Patient's symptoms include fever, fatigue, malaise, nasal congestion, rhinorrhea, cough and headache  Patient denies chills, sore throat, anosmia, loss of taste, shortness of breath, chest tightness, abdominal pain, nausea, vomiting, diarrhea and myalgias  - Date of symptom onset: 8/20/2022  - Date of positive COVID-19 test: 8/22/2022  Type of test: Home antigen  COVID-19 vaccination status: Fully vaccinated with booster    Carolyn Men has been staying home and has isolated themselves in his home  He is taking care to not share personal items and is cleaning all surfaces that are touched often, like counters, tabletops, and doorknobs using household cleaning sprays or wipes   He is wearing a mask when he leaves his room  Started having symptoms on Saturday  Tested positive yesterday  Patient is a teacher    No results found for: [address] [facility] [address], [address] [facility], [address] [facility], [facility], [facility], [facility]  Past Medical History:   Diagnosis Date    Sebaceous cyst     last assessed: Aug 30, 2015 - in the back      Past Surgical History:   Procedure Laterality Date    NO PAST SURGERIES       Current Outpatient Medications   Medication Sig Dispense Refill    nirmatrelvir & ritonavir (Paxlovid, 300/100,) tablet therapy pack Take 3 tablets by mouth 2 (two) times a day for 5 days Take 2 nirmatrelvir tablets + 1 ritonavir tablet together per dose 30 tablet 0    aspirin (ECOTRIN LOW STRENGTH) 81 mg EC tablet Take 81 mg by mouth daily      omega-3-acid ethyl esters (LOVAZA) 1 g capsule Take 2 capsules by mouth 2 (two) times a day      rosuvastatin (CRESTOR) 40 MG tablet TAKE 1 TABLET DAILY 90 tablet 3    tadalafil (Cialis) 5 MG tablet Take 1 tablet (5 mg total) by mouth daily as needed for erectile dysfunction 60 tablet 1     No current facility-administered medications for this visit  No Known Allergies    Review of Systems   Constitutional: Positive for fatigue and fever  Negative for chills  HENT: Positive for congestion and rhinorrhea  Negative for sore throat  Respiratory: Positive for cough  Negative for chest tightness and shortness of breath  Gastrointestinal: Negative for abdominal pain, diarrhea, nausea and vomiting  Musculoskeletal: Negative for myalgias  Neurological: Positive for headaches  Objective:  No vitals as patient did not have equipment    There were no vitals filed for this visit  Physical Exam     It was my intent to perform this visit via video technology but the patient was not able to do a video connection so the visit was completed via audio telephone only  This note has been constructed using a voice recognition system   There may be translation, syntax, or grammatical errors  If you have an questions, please contact the dictating provider

## 2023-02-14 ENCOUNTER — APPOINTMENT (OUTPATIENT)
Dept: LAB | Facility: CLINIC | Age: 63
End: 2023-02-14

## 2023-02-14 DIAGNOSIS — R73.01 IFG (IMPAIRED FASTING GLUCOSE): ICD-10-CM

## 2023-02-14 DIAGNOSIS — D58.2 ELEVATED HEMOGLOBIN (HCC): ICD-10-CM

## 2023-02-14 DIAGNOSIS — E78.5 HYPERLIPIDEMIA LDL GOAL <100: ICD-10-CM

## 2023-02-14 LAB
ALBUMIN SERPL BCP-MCNC: 4.1 G/DL (ref 3.5–5)
ALP SERPL-CCNC: 68 U/L (ref 46–116)
ALT SERPL W P-5'-P-CCNC: 35 U/L (ref 12–78)
ANION GAP SERPL CALCULATED.3IONS-SCNC: 1 MMOL/L (ref 4–13)
AST SERPL W P-5'-P-CCNC: 27 U/L (ref 5–45)
BILIRUB SERPL-MCNC: 0.91 MG/DL (ref 0.2–1)
BUN SERPL-MCNC: 14 MG/DL (ref 5–25)
CALCIUM SERPL-MCNC: 9.6 MG/DL (ref 8.3–10.1)
CHLORIDE SERPL-SCNC: 108 MMOL/L (ref 96–108)
CHOLEST SERPL-MCNC: 192 MG/DL
CO2 SERPL-SCNC: 28 MMOL/L (ref 21–32)
CREAT SERPL-MCNC: 1.02 MG/DL (ref 0.6–1.3)
ERYTHROCYTE [DISTWIDTH] IN BLOOD BY AUTOMATED COUNT: 12.1 % (ref 11.6–15.1)
GFR SERPL CREATININE-BSD FRML MDRD: 77 ML/MIN/1.73SQ M
GLUCOSE P FAST SERPL-MCNC: 102 MG/DL (ref 65–99)
HCT VFR BLD AUTO: 51.3 % (ref 36.5–49.3)
HDLC SERPL-MCNC: 54 MG/DL
HGB BLD-MCNC: 17.4 G/DL (ref 12–17)
LDLC SERPL CALC-MCNC: 115 MG/DL (ref 0–100)
MCH RBC QN AUTO: 30.5 PG (ref 26.8–34.3)
MCHC RBC AUTO-ENTMCNC: 33.9 G/DL (ref 31.4–37.4)
MCV RBC AUTO: 90 FL (ref 82–98)
NONHDLC SERPL-MCNC: 138 MG/DL
PLATELET # BLD AUTO: 260 THOUSANDS/UL (ref 149–390)
PMV BLD AUTO: 9.2 FL (ref 8.9–12.7)
POTASSIUM SERPL-SCNC: 4.3 MMOL/L (ref 3.5–5.3)
PROT SERPL-MCNC: 7.7 G/DL (ref 6.4–8.4)
RBC # BLD AUTO: 5.7 MILLION/UL (ref 3.88–5.62)
SODIUM SERPL-SCNC: 137 MMOL/L (ref 135–147)
TRIGL SERPL-MCNC: 116 MG/DL
WBC # BLD AUTO: 6.11 THOUSAND/UL (ref 4.31–10.16)

## 2023-03-09 ENCOUNTER — OFFICE VISIT (OUTPATIENT)
Dept: FAMILY MEDICINE CLINIC | Facility: CLINIC | Age: 63
End: 2023-03-09

## 2023-03-09 VITALS
HEART RATE: 76 BPM | RESPIRATION RATE: 16 BRPM | SYSTOLIC BLOOD PRESSURE: 136 MMHG | BODY MASS INDEX: 26.05 KG/M2 | HEIGHT: 74 IN | DIASTOLIC BLOOD PRESSURE: 76 MMHG | TEMPERATURE: 98.6 F | WEIGHT: 203 LBS | OXYGEN SATURATION: 96 %

## 2023-03-09 DIAGNOSIS — Z13.1 SCREENING FOR DIABETES MELLITUS (DM): ICD-10-CM

## 2023-03-09 DIAGNOSIS — Z00.00 ANNUAL PHYSICAL EXAM: Primary | ICD-10-CM

## 2023-03-09 DIAGNOSIS — R73.01 IFG (IMPAIRED FASTING GLUCOSE): ICD-10-CM

## 2023-03-09 DIAGNOSIS — R71.8 ELEVATED HEMATOCRIT: ICD-10-CM

## 2023-03-09 DIAGNOSIS — E78.5 HYPERLIPIDEMIA LDL GOAL <100: ICD-10-CM

## 2023-03-09 PROBLEM — U07.1 COVID-19: Status: RESOLVED | Noted: 2022-08-23 | Resolved: 2023-03-09

## 2023-03-09 NOTE — PROGRESS NOTES
Assessment/Plan:    Annual physical exam  Declined screening prostate cancer  cologuard neg 3/5/22  Recheck lipid and fasting sugar in 1 year  Fu in 1 year    Hyperlipidemia LDL goal <100  Most likely familial   in 2015  Has been on a statin since 2018  Lipid panel well controlled  Continue crestor 40mg daily  IFG (impaired fasting glucose)  Mild elevated  Repeat a1c next year  Elevated hematocrit  Normal in 2019  Hydrate well before testing  No signs of liver disease  Will test iron panel with next set of labs  Diagnoses and all orders for this visit:    Annual physical exam    Hyperlipidemia LDL goal <100  -     Lipid panel; Future    IFG (impaired fasting glucose)  -     Hemoglobin A1C; Future    Elevated hematocrit  -     CBC and differential; Future  -     Iron Panel (Includes Ferritin, Iron Sat%, Iron, and TIBC); Future    Screening for diabetes mellitus (DM)  -     Comprehensive metabolic panel; Future        Subjective: labs     Patient ID: Uzma Soto is a 60 y o  male  HPI  Labs reviewed  Lipids with elevated ldl 115  CBC with elevated hb/hct  Fasting sugars mildly elevated  BMI Counseling: Body mass index is 26 06 kg/m²  The BMI is above normal  Nutrition recommendations include consuming healthier snacks and limiting drinks that contain sugar  Rationale for BMI follow-up plan is due to patient being overweight or obese  Depression Screening and Follow-up Plan: Patient was screened for depression during today's encounter  They screened negative with a PHQ-2 score of 0  The following portions of the patient's history were reviewed and updated as appropriate: allergies, current medications, past family history, past medical history, past social history, past surgical history and problem list     Review of Systems   Constitutional: Negative for activity change, appetite change, fever and unexpected weight change     HENT: Negative for ear pain, postnasal drip and rhinorrhea  Eyes: Negative for photophobia and pain  Respiratory: Negative for cough, shortness of breath and wheezing  Cardiovascular: Negative for chest pain, palpitations and leg swelling  Gastrointestinal: Negative for abdominal pain, blood in stool, nausea and vomiting  Endocrine: Negative for polydipsia and polyuria  Genitourinary: Negative for difficulty urinating, hematuria and urgency  Musculoskeletal: Negative for myalgias  Skin: Negative for rash  Neurological: Negative for dizziness  Psychiatric/Behavioral: Negative for confusion and sleep disturbance  PHQ-2/9 Depression Screening    Little interest or pleasure in doing things: 0 - not at all  Feeling down, depressed, or hopeless: 0 - not at all  PHQ-2 Score: 0  PHQ-2 Interpretation: Negative depression screen       [unfilled]    Objective:      /76 (BP Location: Left arm, Patient Position: Sitting, Cuff Size: Adult)   Pulse 76   Temp 98 6 °F (37 °C) (Tympanic)   Resp 16   Ht 6' 2" (1 88 m)   Wt 92 1 kg (203 lb)   SpO2 96%   BMI 26 06 kg/m²          Physical Exam  Constitutional:       General: He is not in acute distress  Appearance: He is well-developed  He is not diaphoretic  HENT:      Head: Normocephalic and atraumatic  Right Ear: External ear normal       Left Ear: External ear normal       Nose: Nose normal    Eyes:      Conjunctiva/sclera: Conjunctivae normal       Pupils: Pupils are equal, round, and reactive to light  Pulmonary:      Effort: Pulmonary effort is normal  No respiratory distress  Skin:     Findings: No rash  Neurological:      Mental Status: He is alert and oriented to person, place, and time     Psychiatric:         Behavior: Behavior normal            Recent Results (from the past 840 hour(s))   Lipid panel    Collection Time: 02/14/23  7:13 AM   Result Value Ref Range    Cholesterol 192 See Comment mg/dL    Triglycerides 116 See Comment mg/dL    HDL, Direct 54 >=40 mg/dL    LDL Calculated 115 (H) 0 - 100 mg/dL    Non-HDL-Chol (CHOL-HDL) 138 mg/dl   Comprehensive metabolic panel    Collection Time: 02/14/23  7:13 AM   Result Value Ref Range    Sodium 137 135 - 147 mmol/L    Potassium 4 3 3 5 - 5 3 mmol/L    Chloride 108 96 - 108 mmol/L    CO2 28 21 - 32 mmol/L    ANION GAP 1 (L) 4 - 13 mmol/L    BUN 14 5 - 25 mg/dL    Creatinine 1 02 0 60 - 1 30 mg/dL    Glucose, Fasting 102 (H) 65 - 99 mg/dL    Calcium 9 6 8 3 - 10 1 mg/dL    AST 27 5 - 45 U/L    ALT 35 12 - 78 U/L    Alkaline Phosphatase 68 46 - 116 U/L    Total Protein 7 7 6 4 - 8 4 g/dL    Albumin 4 1 3 5 - 5 0 g/dL    Total Bilirubin 0 91 0 20 - 1 00 mg/dL    eGFR 77 ml/min/1 73sq m   CBC and Platelet    Collection Time: 02/14/23  7:13 AM   Result Value Ref Range    WBC 6 11 4 31 - 10 16 Thousand/uL    RBC 5 70 (H) 3 88 - 5 62 Million/uL    Hemoglobin 17 4 (H) 12 0 - 17 0 g/dL    Hematocrit 51 3 (H) 36 5 - 49 3 %    MCV 90 82 - 98 fL    MCH 30 5 26 8 - 34 3 pg    MCHC 33 9 31 4 - 37 4 g/dL    RDW 12 1 11 6 - 15 1 %    Platelets 502 377 - 890 Thousands/uL    MPV 9 2 8 9 - 12 7 fL

## 2023-03-09 NOTE — PROGRESS NOTES
[facility]    NAME: Fior Pham  AGE: 60 y o  SEX: male  : 1960     DATE: 3/9/2023     Assessment and Plan:     Problem List Items Addressed This Visit        Endocrine    IFG (impaired fasting glucose)     Mild elevated  Repeat a1c next year  Relevant Orders    Hemoglobin A1C       Other    Hyperlipidemia LDL goal <100     Most likely familial   in 2015  Has been on a statin since 2018  Lipid panel well controlled  Continue crestor 40mg daily  Relevant Orders    Lipid panel    Annual physical exam - Primary     Declined screening prostate cancer  cologuard neg 3/5/22  Recheck lipid and fasting sugar in 1 year  Fu in 1 year         Elevated hematocrit     Normal in 2019  Hydrate well before testing  No signs of liver disease  Will test iron panel with next set of labs  Relevant Orders    CBC and differential    Iron Panel (Includes Ferritin, Iron Sat%, Iron, and TIBC)   Other Visit Diagnoses     Screening for diabetes mellitus (DM)        Relevant Orders    Comprehensive metabolic panel          Immunizations and preventive care screenings were discussed with patient today  Appropriate education was printed on patient's after visit summary  Counseling:  Alcohol/drug use: discussed moderation in alcohol intake, the recommendations for healthy alcohol use, and avoidance of illicit drug use  Dental Health: discussed importance of regular tooth brushing, flossing, and dental visits  Exercise: the importance of regular exercise/physical activity was discussed  Recommend exercise 3-5 times per week for at least 30 minutes  No follow-ups on file       Chief Complaint:     Chief Complaint   Patient presents with   • Physical Exam      History of Present Illness:     Adult Annual Physical   Patient here for a comprehensive physical exam  The patient reports no problems  Diet and Physical Activity  Diet/Nutrition: well balanced diet  Exercise: moderate cardiovascular exercise  Depression Screening  PHQ-2/9 Depression Screening    Little interest or pleasure in doing things: 0 - not at all  Feeling down, depressed, or hopeless: 0 - not at all  PHQ-2 Score: 0  PHQ-2 Interpretation: Negative depression screen       General Health  Sleep: sleeps well  Hearing: no issues  Vision: goes for regular eye exams  Dental: regular dental visits   Health  History of STDs?: no      Review of Systems:     Review of Systems   Constitutional: Negative for activity change, appetite change, fever and unexpected weight change  HENT: Negative for ear pain, postnasal drip and rhinorrhea  Eyes: Negative for photophobia and pain  Respiratory: Negative for cough, shortness of breath and wheezing  Cardiovascular: Negative for chest pain, palpitations and leg swelling  Gastrointestinal: Negative for abdominal pain, blood in stool, nausea and vomiting  Endocrine: Negative for polydipsia and polyuria  Genitourinary: Negative for difficulty urinating, hematuria and urgency  Musculoskeletal: Negative for myalgias  Skin: Negative for rash  Neurological: Negative for dizziness  Psychiatric/Behavioral: Negative for confusion and sleep disturbance  Past Medical History:     Past Medical History:   Diagnosis Date   • Sebaceous cyst     last assessed: Aug 30, 2015 - in the back       Past Surgical History:     Past Surgical History:   Procedure Laterality Date   • NO PAST SURGERIES        Social History:     Social History     Socioeconomic History   • Marital status: Single     Spouse name: None   • Number of children: None   • Years of education: None   • Highest education level: None   Occupational History   • Occupation: Teacher    Tobacco Use   • Smoking status: Never   • Smokeless tobacco: Never   Substance and Sexual Activity   • Alcohol use:  Yes   • Drug use: Never   • Sexual activity: Not Currently   Other Topics Concern   • None   Social History Narrative    Caffeine use      Social Determinants of Health     Financial Resource Strain: Not on file   Food Insecurity: Not on file   Transportation Needs: Not on file   Physical Activity: Not on file   Stress: Not on file   Social Connections: Not on file   Intimate Partner Violence: Not on file   Housing Stability: Not on file      Family History:     Family History   Problem Relation Age of Onset   • Hyperlipidemia Mother    • Lung cancer Father    • No Known Problems Sister    • No Known Problems Brother    • No Known Problems Maternal Grandmother    • Diabetes Maternal Grandfather         mellitus    • Hypertension Maternal Grandfather    • No Known Problems Paternal Grandmother    • No Known Problems Paternal Grandfather       Current Medications:     Current Outpatient Medications   Medication Sig Dispense Refill   • aspirin (ECOTRIN LOW STRENGTH) 81 mg EC tablet Take 81 mg by mouth daily     • rosuvastatin (CRESTOR) 40 MG tablet TAKE 1 TABLET DAILY 90 tablet 3   • tadalafil (Cialis) 5 MG tablet Take 1 tablet (5 mg total) by mouth daily as needed for erectile dysfunction 60 tablet 1   • omega-3-acid ethyl esters (LOVAZA) 1 g capsule Take 2 capsules by mouth 2 (two) times a day (Patient not taking: Reported on 3/9/2023)       No current facility-administered medications for this visit  Allergies:     No Known Allergies   Physical Exam:     /76 (BP Location: Left arm, Patient Position: Sitting, Cuff Size: Adult)   Pulse 76   Temp 98 6 °F (37 °C) (Tympanic)   Resp 16   Ht 6' 2" (1 88 m)   Wt 92 1 kg (203 lb)   SpO2 96%   BMI 26 06 kg/m²     Physical Exam  Constitutional:       General: He is not in acute distress  Appearance: He is well-developed  HENT:      Head: Normocephalic and atraumatic  Eyes:      General: No scleral icterus       Conjunctiva/sclera: Conjunctivae normal  Pupils: Pupils are equal, round, and reactive to light  Cardiovascular:      Rate and Rhythm: Normal rate and regular rhythm  Heart sounds: Normal heart sounds  No murmur heard  No friction rub  No gallop  Pulmonary:      Effort: Pulmonary effort is normal  No respiratory distress  Breath sounds: Normal breath sounds  No wheezing or rales  Abdominal:      General: Bowel sounds are normal  There is no distension  Palpations: Abdomen is soft  There is no mass  Tenderness: There is no abdominal tenderness  There is no guarding  Musculoskeletal:         General: No tenderness  Cervical back: Normal range of motion  Skin:     General: Skin is warm and dry  Findings: No rash  Neurological:      Mental Status: He is alert and oriented to person, place, and time  Cranial Nerves: No cranial nerve deficit  Motor: No abnormal muscle tone            Brigid Whitt MD   [address] [place] [address]

## 2023-03-09 NOTE — ASSESSMENT & PLAN NOTE
Normal in 2019  Hydrate well before testing  No signs of liver disease  Will test iron panel with next set of labs

## 2023-03-09 NOTE — ASSESSMENT & PLAN NOTE
· Declined screening prostate cancer  · cologuard neg 3/5/22  · Recheck lipid and fasting sugar in 1 year  · Fu in 1 year

## 2023-03-09 NOTE — ASSESSMENT & PLAN NOTE
Most likely familial   in 2015  Has been on a statin since 2018  Lipid panel well controlled  Continue crestor 40mg daily

## 2023-05-19 DIAGNOSIS — E78.5 HYPERLIPIDEMIA LDL GOAL <100: ICD-10-CM

## 2023-05-19 RX ORDER — ROSUVASTATIN CALCIUM 40 MG/1
40 TABLET, COATED ORAL DAILY
Qty: 90 TABLET | Refills: 0 | Status: SHIPPED | OUTPATIENT
Start: 2023-05-19

## 2023-05-19 NOTE — TELEPHONE ENCOUNTER
Medication Refill Request     Name rosuvastatin (CRESTOR) 40 MG tablet  Dose/Frequency TAKE 1 TABLET DAILY  Quantity 90 tablet  Verified pharmacy   [x]  Verified ordering Provider   [x]  Does patient have enough for the next 3 days?  Yes [x] No []

## 2023-07-25 ENCOUNTER — TELEPHONE (OUTPATIENT)
Dept: FAMILY MEDICINE CLINIC | Facility: CLINIC | Age: 63
End: 2023-07-25

## 2023-07-25 NOTE — TELEPHONE ENCOUNTER
Patient called and left this message:     Hi my name is Kevin Frias birth date is [birth year]. I'm calling ex. I get my prescriptions through [facility] and they needed some information and my last appointment, nothing was sent over to them. And I've been doing [facility] for quite some time. It's just to renew a prescription and for my cholesterol. And I'm have about 20 tablets left. And I just called somebody beginning at a summer even prior to that and they were supposed to update it and I didn't hear nothing from [facility]. I'm assuming [facility] should be sending me something if they gave them the information and I didn't receive, you know, receive any prescription refill. So if somebody could update the information to [facility] or you could call me my number [phone], again [phone]. Thank you. Returned patient's phone call, no answer left a message for patient to call back.

## 2023-07-26 DIAGNOSIS — N52.9 ERECTILE DYSFUNCTION, UNSPECIFIED ERECTILE DYSFUNCTION TYPE: ICD-10-CM

## 2023-07-26 DIAGNOSIS — E78.5 HYPERLIPIDEMIA LDL GOAL <100: ICD-10-CM

## 2023-07-27 RX ORDER — TADALAFIL 5 MG/1
5 TABLET ORAL DAILY PRN
Qty: 60 TABLET | Refills: 5 | Status: SHIPPED | OUTPATIENT
Start: 2023-07-27

## 2023-07-27 RX ORDER — ROSUVASTATIN CALCIUM 40 MG/1
40 TABLET, COATED ORAL DAILY
Qty: 90 TABLET | Refills: 1 | Status: SHIPPED | OUTPATIENT
Start: 2023-07-27

## 2023-12-18 ENCOUNTER — APPOINTMENT (EMERGENCY)
Dept: RADIOLOGY | Facility: HOSPITAL | Age: 63
End: 2023-12-18
Payer: COMMERCIAL

## 2023-12-18 ENCOUNTER — TELEPHONE (OUTPATIENT)
Dept: FAMILY MEDICINE CLINIC | Facility: CLINIC | Age: 63
End: 2023-12-18

## 2023-12-18 ENCOUNTER — HOSPITAL ENCOUNTER (EMERGENCY)
Facility: HOSPITAL | Age: 63
Discharge: HOME | End: 2023-12-18
Attending: EMERGENCY MEDICINE | Admitting: EMERGENCY MEDICINE
Payer: COMMERCIAL

## 2023-12-18 VITALS
SYSTOLIC BLOOD PRESSURE: 185 MMHG | TEMPERATURE: 98.1 F | DIASTOLIC BLOOD PRESSURE: 93 MMHG | OXYGEN SATURATION: 98 % | HEART RATE: 85 BPM | RESPIRATION RATE: 18 BRPM

## 2023-12-18 DIAGNOSIS — R07.9 CHEST PAIN: Primary | ICD-10-CM

## 2023-12-18 LAB
ALBUMIN SERPL BCP-MCNC: 4.5 G/DL (ref 3.5–5)
ALP SERPL-CCNC: 63 U/L (ref 34–104)
ALT SERPL W P-5'-P-CCNC: 31 U/L (ref 7–52)
ANION GAP SERPL CALCULATED.3IONS-SCNC: 8 MMOL/L
AST SERPL W P-5'-P-CCNC: 24 U/L (ref 13–39)
ATRIAL RATE: 80 BPM
BASOPHILS # BLD AUTO: 0.06 THOUSANDS/ÂΜL (ref 0–0.1)
BASOPHILS NFR BLD AUTO: 1 % (ref 0–1)
BILIRUB SERPL-MCNC: 0.9 MG/DL (ref 0.2–1)
BUN SERPL-MCNC: 14 MG/DL (ref 5–25)
CALCIUM SERPL-MCNC: 10 MG/DL (ref 8.4–10.2)
CARDIAC TROPONIN I PNL SERPL HS: <2 NG/L
CHLORIDE SERPL-SCNC: 104 MMOL/L (ref 96–108)
CO2 SERPL-SCNC: 25 MMOL/L (ref 21–32)
CREAT SERPL-MCNC: 0.91 MG/DL (ref 0.6–1.3)
EOSINOPHIL # BLD AUTO: 0.31 THOUSAND/ÂΜL (ref 0–0.61)
EOSINOPHIL NFR BLD AUTO: 4 % (ref 0–6)
ERYTHROCYTE [DISTWIDTH] IN BLOOD BY AUTOMATED COUNT: 12.4 % (ref 11.6–15.1)
GFR SERPL CREATININE-BSD FRML MDRD: 88 ML/MIN/1.73SQ M
GLUCOSE SERPL-MCNC: 98 MG/DL (ref 65–140)
HCT VFR BLD AUTO: 53.1 % (ref 36.5–49.3)
HGB BLD-MCNC: 17.1 G/DL (ref 12–17)
IMM GRANULOCYTES # BLD AUTO: 0.03 THOUSAND/UL (ref 0–0.2)
IMM GRANULOCYTES NFR BLD AUTO: 0 % (ref 0–2)
LYMPHOCYTES # BLD AUTO: 1.94 THOUSANDS/ÂΜL (ref 0.6–4.47)
LYMPHOCYTES NFR BLD AUTO: 23 % (ref 14–44)
MCH RBC QN AUTO: 30.2 PG (ref 26.8–34.3)
MCHC RBC AUTO-ENTMCNC: 32.2 G/DL (ref 31.4–37.4)
MCV RBC AUTO: 94 FL (ref 82–98)
MONOCYTES # BLD AUTO: 0.71 THOUSAND/ÂΜL (ref 0.17–1.22)
MONOCYTES NFR BLD AUTO: 8 % (ref 4–12)
NEUTROPHILS # BLD AUTO: 5.46 THOUSANDS/ÂΜL (ref 1.85–7.62)
NEUTS SEG NFR BLD AUTO: 64 % (ref 43–75)
NRBC BLD AUTO-RTO: 0 /100 WBCS
P AXIS: 65 DEGREES
PLATELET # BLD AUTO: 264 THOUSANDS/UL (ref 149–390)
PMV BLD AUTO: 9 FL (ref 8.9–12.7)
POTASSIUM SERPL-SCNC: 3.9 MMOL/L (ref 3.5–5.3)
PR INTERVAL: 156 MS
PROT SERPL-MCNC: 7.7 G/DL (ref 6.4–8.4)
QRS AXIS: 44 DEGREES
QRSD INTERVAL: 84 MS
QT INTERVAL: 374 MS
QTC INTERVAL: 431 MS
RBC # BLD AUTO: 5.67 MILLION/UL (ref 3.88–5.62)
SODIUM SERPL-SCNC: 137 MMOL/L (ref 135–147)
T WAVE AXIS: 25 DEGREES
VENTRICULAR RATE: 80 BPM
WBC # BLD AUTO: 8.51 THOUSAND/UL (ref 4.31–10.16)

## 2023-12-18 PROCEDURE — 93005 ELECTROCARDIOGRAM TRACING: CPT

## 2023-12-18 PROCEDURE — 71046 X-RAY EXAM CHEST 2 VIEWS: CPT

## 2023-12-18 PROCEDURE — 36415 COLL VENOUS BLD VENIPUNCTURE: CPT

## 2023-12-18 PROCEDURE — 84484 ASSAY OF TROPONIN QUANT: CPT

## 2023-12-18 PROCEDURE — 80053 COMPREHEN METABOLIC PANEL: CPT

## 2023-12-18 PROCEDURE — 99285 EMERGENCY DEPT VISIT HI MDM: CPT

## 2023-12-18 PROCEDURE — 85025 COMPLETE CBC W/AUTO DIFF WBC: CPT

## 2023-12-18 NOTE — DISCHARGE INSTRUCTIONS
You were seen in the ED for chest pain. Return to the ED for any worsening symptoms or new symptoms. Follow up with your primary care doctor as soon as possible.

## 2023-12-18 NOTE — Clinical Note
Valerio Wilkins was seen and treated in our emergency department on 12/18/2023.                Diagnosis: chest pain    Valerio  is off the rest of the shift today, may return to work on return date.    He may return on this date: 12/19/2023         If you have any questions or concerns, please don't hesitate to call.      Leonid Ryan MD    ______________________________           _______________          _______________  Hospital Representative                              Date                                Time

## 2023-12-18 NOTE — ED PROVIDER NOTES
History  Chief Complaint   Patient presents with    Chest Pain     Pt c/o intermittent chest pain. States it feels like a pinch, followed by numbness/tingling in both arms. Pt denies SOB.      64 y/o male poatient with hx of HLD, ED on cialis, presenting with left chest pincihing sensation and pins and needles sensation in all his extremites x 2weeks. States intermiteent sensation but not currently having it. states felt it this morning. Dneie SOB, worsening pain with movement. Does not worsen with exerton or eating. Denies fever lightheadedness, vomting, nausea,. Diarrhea.  Patient states the chest pain comes on spontaneously and last minutes to hours and resolves.  Denies anything brings it on or makes it feel better.        Prior to Admission Medications   Prescriptions Last Dose Informant Patient Reported? Taking?   aspirin (ECOTRIN LOW STRENGTH) 81 mg EC tablet  Self Yes No   Sig: Take 81 mg by mouth daily   omega-3-acid ethyl esters (LOVAZA) 1 g capsule  Self Yes No   Sig: Take 2 capsules by mouth 2 (two) times a day   Patient not taking: Reported on 3/9/2023   rosuvastatin (CRESTOR) 40 MG tablet   No No   Sig: Take 1 tablet (40 mg total) by mouth daily   tadalafil (Cialis) 5 MG tablet   No No   Sig: Take 1 tablet (5 mg total) by mouth daily as needed for erectile dysfunction      Facility-Administered Medications: None       Past Medical History:   Diagnosis Date    Sebaceous cyst     last assessed: Aug 30, 2015 - in the back        Past Surgical History:   Procedure Laterality Date    NO PAST SURGERIES         Family History   Problem Relation Age of Onset    Hyperlipidemia Mother     Lung cancer Father     No Known Problems Sister     No Known Problems Brother     No Known Problems Maternal Grandmother     Diabetes Maternal Grandfather         mellitus     Hypertension Maternal Grandfather     No Known Problems Paternal Grandmother     No Known Problems Paternal Grandfather      I have reviewed and agree  with the history as documented.    E-Cigarette/Vaping     E-Cigarette/Vaping Substances     Social History     Tobacco Use    Smoking status: Never    Smokeless tobacco: Never   Substance Use Topics    Alcohol use: Yes    Drug use: Never        Review of Systems   Cardiovascular:  Positive for chest pain.   Neurological:         Paresthesias   All other systems reviewed and are negative.      Physical Exam  ED Triage Vitals [12/18/23 1220]   Temperature Pulse Respirations Blood Pressure SpO2   98.1 °F (36.7 °C) 85 18 (!) 185/93 98 %      Temp Source Heart Rate Source Patient Position - Orthostatic VS BP Location FiO2 (%)   Oral Monitor Sitting Right arm --      Pain Score       --             Orthostatic Vital Signs  Vitals:    12/18/23 1220   BP: (!) 185/93   Pulse: 85   Patient Position - Orthostatic VS: Sitting       Physical Exam  Vitals reviewed.   Constitutional:       Appearance: Normal appearance.   HENT:      Head: Normocephalic and atraumatic.      Nose: Nose normal.      Mouth/Throat:      Mouth: Mucous membranes are moist.      Pharynx: Oropharynx is clear.   Eyes:      Extraocular Movements: Extraocular movements intact.      Conjunctiva/sclera: Conjunctivae normal.   Cardiovascular:      Rate and Rhythm: Normal rate and regular rhythm.      Pulses: Normal pulses.      Heart sounds: Normal heart sounds.   Pulmonary:      Effort: Pulmonary effort is normal.      Breath sounds: Normal breath sounds.   Chest:      Chest wall: No tenderness.   Abdominal:      General: Bowel sounds are normal.      Palpations: Abdomen is soft.      Tenderness: There is no abdominal tenderness.   Musculoskeletal:         General: Normal range of motion.      Cervical back: Normal range of motion.      Right lower leg: No tenderness. No edema.      Left lower leg: No tenderness. No edema.   Skin:     General: Skin is warm and dry.   Neurological:      General: No focal deficit present.      Mental Status: He is alert and  oriented to person, place, and time. Mental status is at baseline.         ED Medications  Medications - No data to display    Diagnostic Studies  Results Reviewed       Procedure Component Value Units Date/Time    CBC and differential [[id number]]  (Abnormal) Collected: 12/18/23 1336    Lab Status: Final result Specimen: Blood from Arm, Left Updated: 12/18/23 1352     WBC 8.51 Thousand/uL      RBC 5.67 Million/uL      Hemoglobin 17.1 g/dL      Hematocrit 53.1 %      MCV 94 fL      MCH 30.2 pg      MCHC 32.2 g/dL      RDW 12.4 %      MPV 9.0 fL      Platelets 264 Thousands/uL      nRBC 0 /100 WBCs      Neutrophils Relative 64 %      Immat GRANS % 0 %      Lymphocytes Relative 23 %      Monocytes Relative 8 %      Eosinophils Relative 4 %      Basophils Relative 1 %      Neutrophils Absolute 5.46 Thousands/µL      Immature Grans Absolute 0.03 Thousand/uL      Lymphocytes Absolute 1.94 Thousands/µL      Monocytes Absolute 0.71 Thousand/µL      Eosinophils Absolute 0.31 Thousand/µL      Basophils Absolute 0.06 Thousands/µL     HS Troponin 0hr (reflex protocol) [[id number]]  (Normal) Collected: 12/18/23 1259    Lab Status: Final result Specimen: Blood from Arm, Left Updated: 12/18/23 1342     hs TnI 0hr <2 ng/L     Comprehensive metabolic panel [[id number]] Collected: 12/18/23 1259    Lab Status: Final result Specimen: Blood from Arm, Left Updated: 12/18/23 1332     Sodium 137 mmol/L      Potassium 3.9 mmol/L      Chloride 104 mmol/L      CO2 25 mmol/L      ANION GAP 8 mmol/L      BUN 14 mg/dL      Creatinine 0.91 mg/dL      Glucose 98 mg/dL      Calcium 10.0 mg/dL      AST 24 U/L      ALT 31 U/L      Alkaline Phosphatase 63 U/L      Total Protein 7.7 g/dL      Albumin 4.5 g/dL      Total Bilirubin 0.90 mg/dL      eGFR 88 ml/min/1.73sq m     Narrative:      National Kidney Disease Foundation guidelines for Chronic Kidney Disease (CKD):     Stage 1 with normal or high GFR (GFR > 90 mL/min/1.73 square meters)    Stage 2 Mild  CKD (GFR = 60-89 mL/min/1.73 square meters)    Stage 3A Moderate CKD (GFR = 45-59 mL/min/1.73 square meters)    Stage 3B Moderate CKD (GFR = 30-44 mL/min/1.73 square meters)    Stage 4 Severe CKD (GFR = 15-29 mL/min/1.73 square meters)    Stage 5 End Stage CKD (GFR <15 mL/min/1.73 square meters)  Note: GFR calculation is accurate only with a steady state creatinine                   XR chest 2 views   Final Result by Antonio Harris MD (12/18 1647)      No acute cardiopulmonary disease.                  Workstation performed: [id number]               Procedures  Procedures      ED Course  ED Course as of 12/19/23 1325   Mon Dec 18, 2023   1244 EKG: normal EKG, normal sinus rhythm, unchanged from previous tracings                               SBIRT 20yo+      Flowsheet Row Most Recent Value   Initial Alcohol Screen: US AUDIT-C     1. How often do you have a drink containing alcohol? 0 Filed at: 12/18/2023 1220   2. How many drinks containing alcohol do you have on a typical day you are drinking?  0 Filed at: 12/18/2023 1220   3a. Male UNDER 65: How often do you have five or more drinks on one occasion? 0 Filed at: 12/18/2023 1220   Audit-C Score 0 Filed at: 12/18/2023 1220   YODIT: How many times in the past year have you...    Used an illegal drug or used a prescription medication for non-medical reasons? Never Filed at: 12/18/2023 1220                  Medical Decision Making  63-year-old male patient presenting with chest pain and numbness and tingling to bilateral extremities onset 2 weeks.  No shortness of breath.  DDx includes costochondritis, musculoskeletal pain, diabetes, ACS, electrolyte abnormalities.  Troponin negative.  Labs otherwise within normal limits.  Chest x-ray negative.  EKG within normal limits.  Likely musculoskeletal.  Safe for discharge with follow-up with PCP.  Return precautions given.  Discussed results with patient.    Amount and/or Complexity of Data Reviewed  Labs:  ordered.  Radiology: ordered.          Disposition  Final diagnoses:   Chest pain     Time reflects when diagnosis was documented in both MDM as applicable and the Disposition within this note       Time User Action Codes Description Comment    12/18/2023  2:54 PM Leonid Ryan Add [R07.9] Chest pain           ED Disposition       ED Disposition   Discharge    Condition   Stable    Date/Time   Mon Dec 18, 2023 1454    Comment   Valerio Wilkins discharge to home/self care.                   Follow-up Information       Follow up With Specialties Details Why Contact Info    Marcelle Pino MD Family Medicine Schedule an appointment as soon as possible for a visit   [address] [place] [address]  [phone]              Discharge Medication List as of 12/18/2023  2:54 PM        CONTINUE these medications which have NOT CHANGED    Details   aspirin (ECOTRIN LOW STRENGTH) 81 mg EC tablet Take 81 mg by mouth daily, Historical Med      omega-3-acid ethyl esters (LOVAZA) 1 g capsule Take 2 capsules by mouth 2 (two) times a day, Starting Fri 9/4/2015, Historical Med      rosuvastatin (CRESTOR) 40 MG tablet Take 1 tablet (40 mg total) by mouth daily, Starting Thu 7/27/2023, Normal      tadalafil (Cialis) 5 MG tablet Take 1 tablet (5 mg total) by mouth daily as needed for erectile dysfunction, Starting Thu 7/27/2023, Normal           No discharge procedures on file.    PDMP Review       None             ED Provider  Attending physically available and evaluated Valerio Wilkins. I managed the patient along with the ED Attending.    Electronically Signed by           Leonid Ryan MD  12/19/23 3495

## 2023-12-18 NOTE — ED ATTENDING ATTESTATION
12/18/2023  I, Ashli Martin DO, saw and evaluated the patient. I have discussed the patient with the resident/non-physician practitioner and agree with the resident's/non-physician practitioner's findings, Plan of Care, and MDM as documented in the resident's/non-physician practitioner's note, except where noted. All available labs and Radiology studies were reviewed.  I was present for key portions of any procedure(s) performed by the resident/non-physician practitioner and I was immediately available to provide assistance.       At this point I agree with the current assessment done in the Emergency Department.  I have conducted an independent evaluation of this patient a history and physical is as follows:    63-year-old male presents with chest pain.  Patient states he been having it off and on for 2 weeks or more.  Patient states it feels like the left chest pinching sensation with pins-and-needles in all his extremities.  He states it lasts very briefly.  Denies feeling lightheaded, no vomiting or nausea.  On exam-no acute distress, heart regular, no respiratory distress, lungs clear bilaterally.  Plan-will check cardiac labs but suspect this is noncardiac etiology patient will require follow-up with family doctor    ED Course         Critical Care Time  Procedures

## 2023-12-18 NOTE — TELEPHONE ENCOUNTER
Patient called. He has been experiencing bilateral numbness like pins and needles in the arms and legs. Patent also says he get chest pain also. Advised patient to got to the ER for evaluation of his symptoms. Dr. Parrish agreed.

## 2024-01-23 DIAGNOSIS — E78.5 HYPERLIPIDEMIA LDL GOAL <100: ICD-10-CM

## 2024-01-23 RX ORDER — ROSUVASTATIN CALCIUM 40 MG/1
40 TABLET, COATED ORAL DAILY
Qty: 90 TABLET | Refills: 1 | Status: SHIPPED | OUTPATIENT
Start: 2024-01-23

## 2024-02-08 ENCOUNTER — TELEPHONE (OUTPATIENT)
Dept: FAMILY MEDICINE CLINIC | Facility: CLINIC | Age: 64
End: 2024-02-08

## 2024-02-08 NOTE — TELEPHONE ENCOUNTER
Patient called and left a message, he want to know if he has any pending labs he should get done. Called patient back, no answer left a message for patient to call back.

## 2024-02-19 ENCOUNTER — APPOINTMENT (OUTPATIENT)
Dept: LAB | Facility: CLINIC | Age: 64
End: 2024-02-19
Payer: COMMERCIAL

## 2024-02-19 DIAGNOSIS — Z13.1 SCREENING FOR DIABETES MELLITUS (DM): ICD-10-CM

## 2024-02-19 DIAGNOSIS — R73.01 IFG (IMPAIRED FASTING GLUCOSE): ICD-10-CM

## 2024-02-19 DIAGNOSIS — E78.5 HYPERLIPIDEMIA LDL GOAL <100: ICD-10-CM

## 2024-02-19 DIAGNOSIS — R71.8 ELEVATED HEMATOCRIT: ICD-10-CM

## 2024-02-19 LAB
ALBUMIN SERPL BCP-MCNC: 4.5 G/DL (ref 3.5–5)
ALP SERPL-CCNC: 64 U/L (ref 34–104)
ALT SERPL W P-5'-P-CCNC: 29 U/L (ref 7–52)
ANION GAP SERPL CALCULATED.3IONS-SCNC: 8 MMOL/L
AST SERPL W P-5'-P-CCNC: 25 U/L (ref 13–39)
BASOPHILS # BLD AUTO: 0.05 THOUSANDS/ÂΜL (ref 0–0.1)
BASOPHILS NFR BLD AUTO: 1 % (ref 0–1)
BILIRUB SERPL-MCNC: 1.08 MG/DL (ref 0.2–1)
BUN SERPL-MCNC: 12 MG/DL (ref 5–25)
CALCIUM SERPL-MCNC: 9.9 MG/DL (ref 8.4–10.2)
CHLORIDE SERPL-SCNC: 101 MMOL/L (ref 96–108)
CHOLEST SERPL-MCNC: 199 MG/DL
CO2 SERPL-SCNC: 29 MMOL/L (ref 21–32)
CREAT SERPL-MCNC: 0.88 MG/DL (ref 0.6–1.3)
EOSINOPHIL # BLD AUTO: 0.28 THOUSAND/ÂΜL (ref 0–0.61)
EOSINOPHIL NFR BLD AUTO: 4 % (ref 0–6)
ERYTHROCYTE [DISTWIDTH] IN BLOOD BY AUTOMATED COUNT: 12.3 % (ref 11.6–15.1)
EST. AVERAGE GLUCOSE BLD GHB EST-MCNC: 120 MG/DL
FERRITIN SERPL-MCNC: 361 NG/ML (ref 24–336)
GFR SERPL CREATININE-BSD FRML MDRD: 90 ML/MIN/1.73SQ M
GLUCOSE P FAST SERPL-MCNC: 100 MG/DL (ref 65–99)
HBA1C MFR BLD: 5.8 %
HCT VFR BLD AUTO: 51.5 % (ref 36.5–49.3)
HDLC SERPL-MCNC: 50 MG/DL
HGB BLD-MCNC: 17.4 G/DL (ref 12–17)
IMM GRANULOCYTES # BLD AUTO: 0.02 THOUSAND/UL (ref 0–0.2)
IMM GRANULOCYTES NFR BLD AUTO: 0 % (ref 0–2)
IRON SATN MFR SERPL: 35 % (ref 15–50)
IRON SERPL-MCNC: 105 UG/DL (ref 50–212)
LDLC SERPL CALC-MCNC: 125 MG/DL (ref 0–100)
LYMPHOCYTES # BLD AUTO: 1.91 THOUSANDS/ÂΜL (ref 0.6–4.47)
LYMPHOCYTES NFR BLD AUTO: 25 % (ref 14–44)
MCH RBC QN AUTO: 31.1 PG (ref 26.8–34.3)
MCHC RBC AUTO-ENTMCNC: 33.8 G/DL (ref 31.4–37.4)
MCV RBC AUTO: 92 FL (ref 82–98)
MONOCYTES # BLD AUTO: 0.69 THOUSAND/ÂΜL (ref 0.17–1.22)
MONOCYTES NFR BLD AUTO: 9 % (ref 4–12)
NEUTROPHILS # BLD AUTO: 4.58 THOUSANDS/ÂΜL (ref 1.85–7.62)
NEUTS SEG NFR BLD AUTO: 61 % (ref 43–75)
NONHDLC SERPL-MCNC: 149 MG/DL
NRBC BLD AUTO-RTO: 0 /100 WBCS
PLATELET # BLD AUTO: 281 THOUSANDS/UL (ref 149–390)
PMV BLD AUTO: 9.3 FL (ref 8.9–12.7)
POTASSIUM SERPL-SCNC: 4 MMOL/L (ref 3.5–5.3)
PROT SERPL-MCNC: 7.8 G/DL (ref 6.4–8.4)
RBC # BLD AUTO: 5.6 MILLION/UL (ref 3.88–5.62)
SODIUM SERPL-SCNC: 138 MMOL/L (ref 135–147)
TIBC SERPL-MCNC: 301 UG/DL (ref 250–450)
TRIGL SERPL-MCNC: 120 MG/DL
UIBC SERPL-MCNC: 196 UG/DL (ref 155–355)
WBC # BLD AUTO: 7.53 THOUSAND/UL (ref 4.31–10.16)

## 2024-02-19 PROCEDURE — 83540 ASSAY OF IRON: CPT

## 2024-02-19 PROCEDURE — 83550 IRON BINDING TEST: CPT

## 2024-02-19 PROCEDURE — 85025 COMPLETE CBC W/AUTO DIFF WBC: CPT

## 2024-02-19 PROCEDURE — 80061 LIPID PANEL: CPT

## 2024-02-19 PROCEDURE — 36415 COLL VENOUS BLD VENIPUNCTURE: CPT

## 2024-02-19 PROCEDURE — 83036 HEMOGLOBIN GLYCOSYLATED A1C: CPT

## 2024-02-19 PROCEDURE — 80053 COMPREHEN METABOLIC PANEL: CPT

## 2024-02-19 PROCEDURE — 82728 ASSAY OF FERRITIN: CPT

## 2024-03-15 ENCOUNTER — OFFICE VISIT (OUTPATIENT)
Dept: FAMILY MEDICINE CLINIC | Facility: CLINIC | Age: 64
End: 2024-03-15
Payer: COMMERCIAL

## 2024-03-15 VITALS
BODY MASS INDEX: 25.57 KG/M2 | WEIGHT: 199.2 LBS | DIASTOLIC BLOOD PRESSURE: 76 MMHG | RESPIRATION RATE: 16 BRPM | TEMPERATURE: 97.6 F | OXYGEN SATURATION: 97 % | HEART RATE: 78 BPM | SYSTOLIC BLOOD PRESSURE: 128 MMHG | HEIGHT: 74 IN

## 2024-03-15 DIAGNOSIS — Z11.4 SCREENING FOR HIV (HUMAN IMMUNODEFICIENCY VIRUS): ICD-10-CM

## 2024-03-15 DIAGNOSIS — E78.5 HYPERLIPIDEMIA LDL GOAL <100: ICD-10-CM

## 2024-03-15 DIAGNOSIS — R03.0 ELEVATED BP WITHOUT DIAGNOSIS OF HYPERTENSION: Primary | ICD-10-CM

## 2024-03-15 DIAGNOSIS — R35.1 NOCTURIA: ICD-10-CM

## 2024-03-15 DIAGNOSIS — R73.01 IFG (IMPAIRED FASTING GLUCOSE): ICD-10-CM

## 2024-03-15 DIAGNOSIS — E55.9 VITAMIN D DEFICIENCY: ICD-10-CM

## 2024-03-15 PROCEDURE — 99214 OFFICE O/P EST MOD 30 MIN: CPT | Performed by: FAMILY MEDICINE

## 2024-03-15 NOTE — PROGRESS NOTES
Name: Valerio Wilkins      : 1960      MRN: [id number]  Encounter Provider: Marylou Parrish MD  Encounter Date: 3/15/2024   Encounter department: [facility]    Assessment & Plan     1. Elevated BP without diagnosis of hypertension  -     CBC and differential; Future  -     Comprehensive metabolic panel; Future  -     TSH, 3rd generation with Free T4 reflex; Future  -     UA w Reflex to Microscopic w Reflex to Culture; Future; Expected date: 03/15/2024    2. Hyperlipidemia LDL goal <100  -     Comprehensive metabolic panel; Future  -     Lipid panel; Future    3. IFG (impaired fasting glucose)  -     Comprehensive metabolic panel; Future  -     Hemoglobin A1C; Future  -     Albumin / creatinine urine ratio; Future    4. Vitamin D deficiency    5. Nocturia    6. Screening for HIV (human immunodeficiency virus)  -     HIV 1/2 AG/AB w Reflex [facility] for 2 yr old and above; Future      Regarding his elevated BP that was found in the emergency room patient BP today is normal.  Discussed with patient.  Low-salt diet.  Exercise.  Hydrate well.  Decrease any stressors as well.  Patient will check labs above and we will follow-up appropriately.  Regarding his hyperlipidemia patient is on his statin.  Patient has no complaints or any side effects from it.  His LFTs are normal as well.  And his last LDL was 125 up from 115.  His total bilirubin was 1.08.  Discussed with patient.  Lower his fats from his diet.  Exercise.  Continue statin.  Recheck labs again in 6 months including his LFTs.  Regarding his IGF patient advised to cut out starches and sweets from his diet as well.  Exercise.  Hydrate well.  Will check labs.  Regarding his vitamin D deficiency patient advised to supplement with vitamin D3 daily.  Will check labs also.  Regarding his nocturia which is not always patient will check labs including PSA.  Patient also advised to abstain from too much fluid closer to his bedtime.  HIV  "screening is done with patient's permission.  RTO 6 months for his annual physical and do the blood work and urine before.         Subjective      63-year-old male here for follow-up on his elevated BP that he had when he was in the emergency room on 18 December last year for chest pain.  Upon his workup of it patient was deemed to have musculoskeletal type of chest pain.  His EKG and troponin normal  .  Patient has not had those issues since.  Patient denies tobacco.      Review of Systems   Constitutional:  Negative for fatigue and unexpected weight change.   HENT:  Negative for congestion and sore throat.    Eyes:  Negative for visual disturbance.   Respiratory:  Negative for cough and shortness of breath.    Cardiovascular:  Negative for chest pain and palpitations.   Gastrointestinal:  Negative for abdominal pain.   Genitourinary:  Negative for dysuria and hematuria.   Neurological:  Negative for dizziness and headaches.   Psychiatric/Behavioral:  Negative for dysphoric mood. The patient is not nervous/anxious.        Current Outpatient Medications on File Prior to Visit   Medication Sig   • aspirin (ECOTRIN LOW STRENGTH) 81 mg EC tablet Take 81 mg by mouth daily   • omega-3-acid ethyl esters (LOVAZA) 1 g capsule Take 2 capsules by mouth 2 (two) times a day   • rosuvastatin (CRESTOR) 40 MG tablet TAKE 1 TABLET DAILY   • tadalafil (Cialis) 5 MG tablet Take 1 tablet (5 mg total) by mouth daily as needed for erectile dysfunction       Objective     /76 (BP Location: Left arm, Patient Position: Sitting, Cuff Size: Adult)   Pulse 78   Temp 97.6 °F (36.4 °C) (Temporal)   Resp 16   Ht 6' 2\" (1.88 m)   Wt 90.4 kg (199 lb 3.2 oz)   SpO2 97%   BMI 25.58 kg/m²     Physical Exam  Vitals reviewed.   Constitutional:       General: He is not in acute distress.     Appearance: Normal appearance. He is not ill-appearing.   HENT:      Head: Normocephalic and atraumatic.      Mouth/Throat:      Mouth: Mucous " membranes are moist.      Pharynx: Oropharynx is clear.   Eyes:      Extraocular Movements: Extraocular movements intact.      Conjunctiva/sclera: Conjunctivae normal.   Neck:      Vascular: No carotid bruit.   Cardiovascular:      Rate and Rhythm: Normal rate and regular rhythm.   Pulmonary:      Effort: Pulmonary effort is normal.      Breath sounds: Normal breath sounds.   Abdominal:      General: Bowel sounds are normal.      Palpations: Abdomen is soft.      Tenderness: There is no abdominal tenderness. There is no right CVA tenderness or left CVA tenderness.   Musculoskeletal:      Cervical back: Neck supple.      Right lower leg: No edema.      Left lower leg: No edema.      Comments: No calf tenderness bilateral.   Lymphadenopathy:      Cervical: No cervical adenopathy.   Skin:     General: Skin is warm.   Neurological:      General: No focal deficit present.      Mental Status: He is alert and oriented to person, place, and time.   Psychiatric:         Mood and Affect: Mood normal.         Behavior: Behavior normal.       Marylou Parrish MD

## 2024-04-07 ENCOUNTER — TELEPHONE (OUTPATIENT)
Dept: OTHER | Facility: OTHER | Age: 64
End: 2024-04-07

## 2024-04-07 NOTE — TELEPHONE ENCOUNTER
Patient is calling regarding cancelling an appointment.    Date/Time: 04/10/2024 4:20 PM     Patient was rescheduled: YES [] NO [x]    Patient requesting call back to reschedule: YES [] NO [x]

## 2024-04-08 NOTE — TELEPHONE ENCOUNTER
Patient called, he does not want to have the HIV test done; he does not feel that it is necessary and he does not know if his insurance will cover it.

## 2024-07-22 DIAGNOSIS — E78.5 HYPERLIPIDEMIA LDL GOAL <100: ICD-10-CM

## 2024-07-22 RX ORDER — ROSUVASTATIN CALCIUM 40 MG/1
40 TABLET, COATED ORAL DAILY
Qty: 100 TABLET | Refills: 1 | Status: SHIPPED | OUTPATIENT
Start: 2024-07-22

## 2024-08-13 ENCOUNTER — APPOINTMENT (OUTPATIENT)
Dept: LAB | Facility: CLINIC | Age: 64
End: 2024-08-13
Payer: COMMERCIAL

## 2024-08-13 DIAGNOSIS — R03.0 ELEVATED BP WITHOUT DIAGNOSIS OF HYPERTENSION: ICD-10-CM

## 2024-08-13 DIAGNOSIS — E78.5 HYPERLIPIDEMIA LDL GOAL <100: ICD-10-CM

## 2024-08-13 DIAGNOSIS — R73.01 IFG (IMPAIRED FASTING GLUCOSE): ICD-10-CM

## 2024-08-13 LAB
ALBUMIN SERPL BCG-MCNC: 4.3 G/DL (ref 3.5–5)
ALP SERPL-CCNC: 61 U/L (ref 34–104)
ALT SERPL W P-5'-P-CCNC: 23 U/L (ref 7–52)
ANION GAP SERPL CALCULATED.3IONS-SCNC: 10 MMOL/L (ref 4–13)
AST SERPL W P-5'-P-CCNC: 23 U/L (ref 13–39)
BASOPHILS # BLD AUTO: 0.07 THOUSANDS/ÂΜL (ref 0–0.1)
BASOPHILS NFR BLD AUTO: 1 % (ref 0–1)
BILIRUB SERPL-MCNC: 1.29 MG/DL (ref 0.2–1)
BILIRUB UR QL STRIP: NEGATIVE
BUN SERPL-MCNC: 16 MG/DL (ref 5–25)
CALCIUM SERPL-MCNC: 9.4 MG/DL (ref 8.4–10.2)
CHLORIDE SERPL-SCNC: 102 MMOL/L (ref 96–108)
CHOLEST SERPL-MCNC: 195 MG/DL
CLARITY UR: CLEAR
CO2 SERPL-SCNC: 26 MMOL/L (ref 21–32)
COLOR UR: NORMAL
CREAT SERPL-MCNC: 0.9 MG/DL (ref 0.6–1.3)
CREAT UR-MCNC: 66.1 MG/DL
EOSINOPHIL # BLD AUTO: 0.3 THOUSAND/ÂΜL (ref 0–0.61)
EOSINOPHIL NFR BLD AUTO: 4 % (ref 0–6)
ERYTHROCYTE [DISTWIDTH] IN BLOOD BY AUTOMATED COUNT: 12.6 % (ref 11.6–15.1)
EST. AVERAGE GLUCOSE BLD GHB EST-MCNC: 120 MG/DL
GFR SERPL CREATININE-BSD FRML MDRD: 89 ML/MIN/1.73SQ M
GLUCOSE P FAST SERPL-MCNC: 91 MG/DL (ref 65–99)
GLUCOSE UR STRIP-MCNC: NEGATIVE MG/DL
HBA1C MFR BLD: 5.8 %
HCT VFR BLD AUTO: 50.1 % (ref 36.5–49.3)
HDLC SERPL-MCNC: 51 MG/DL
HGB BLD-MCNC: 16.8 G/DL (ref 12–17)
HGB UR QL STRIP.AUTO: NEGATIVE
IMM GRANULOCYTES # BLD AUTO: 0.01 THOUSAND/UL (ref 0–0.2)
IMM GRANULOCYTES NFR BLD AUTO: 0 % (ref 0–2)
KETONES UR STRIP-MCNC: NEGATIVE MG/DL
LDLC SERPL CALC-MCNC: 111 MG/DL (ref 0–100)
LEUKOCYTE ESTERASE UR QL STRIP: NEGATIVE
LYMPHOCYTES # BLD AUTO: 1.87 THOUSANDS/ÂΜL (ref 0.6–4.47)
LYMPHOCYTES NFR BLD AUTO: 24 % (ref 14–44)
MCH RBC QN AUTO: 30.6 PG (ref 26.8–34.3)
MCHC RBC AUTO-ENTMCNC: 33.5 G/DL (ref 31.4–37.4)
MCV RBC AUTO: 91 FL (ref 82–98)
MICROALBUMIN UR-MCNC: <7 MG/L
MONOCYTES # BLD AUTO: 0.78 THOUSAND/ÂΜL (ref 0.17–1.22)
MONOCYTES NFR BLD AUTO: 10 % (ref 4–12)
NEUTROPHILS # BLD AUTO: 4.92 THOUSANDS/ÂΜL (ref 1.85–7.62)
NEUTS SEG NFR BLD AUTO: 61 % (ref 43–75)
NITRITE UR QL STRIP: NEGATIVE
NONHDLC SERPL-MCNC: 144 MG/DL
NRBC BLD AUTO-RTO: 0 /100 WBCS
PH UR STRIP.AUTO: 7 [PH]
PLATELET # BLD AUTO: 240 THOUSANDS/UL (ref 149–390)
PMV BLD AUTO: 9 FL (ref 8.9–12.7)
POTASSIUM SERPL-SCNC: 3.7 MMOL/L (ref 3.5–5.3)
PROT SERPL-MCNC: 7.4 G/DL (ref 6.4–8.4)
PROT UR STRIP-MCNC: NEGATIVE MG/DL
RBC # BLD AUTO: 5.49 MILLION/UL (ref 3.88–5.62)
SODIUM SERPL-SCNC: 138 MMOL/L (ref 135–147)
SP GR UR STRIP.AUTO: 1.01 (ref 1–1.03)
TRIGL SERPL-MCNC: 164 MG/DL
TSH SERPL DL<=0.05 MIU/L-ACNC: 2.75 UIU/ML (ref 0.45–4.5)
UROBILINOGEN UR STRIP-ACNC: <2 MG/DL
WBC # BLD AUTO: 7.95 THOUSAND/UL (ref 4.31–10.16)

## 2024-08-13 PROCEDURE — 84443 ASSAY THYROID STIM HORMONE: CPT

## 2024-08-13 PROCEDURE — 80053 COMPREHEN METABOLIC PANEL: CPT

## 2024-08-13 PROCEDURE — 82043 UR ALBUMIN QUANTITATIVE: CPT

## 2024-08-13 PROCEDURE — 36415 COLL VENOUS BLD VENIPUNCTURE: CPT

## 2024-08-13 PROCEDURE — 82570 ASSAY OF URINE CREATININE: CPT

## 2024-08-13 PROCEDURE — 83036 HEMOGLOBIN GLYCOSYLATED A1C: CPT

## 2024-08-13 PROCEDURE — 80061 LIPID PANEL: CPT

## 2024-08-13 PROCEDURE — 81003 URINALYSIS AUTO W/O SCOPE: CPT

## 2024-08-13 PROCEDURE — 85025 COMPLETE CBC W/AUTO DIFF WBC: CPT

## 2024-09-27 ENCOUNTER — TELEPHONE (OUTPATIENT)
Dept: FAMILY MEDICINE CLINIC | Facility: CLINIC | Age: 64
End: 2024-09-27

## 2024-10-14 ENCOUNTER — OFFICE VISIT (OUTPATIENT)
Dept: FAMILY MEDICINE CLINIC | Facility: CLINIC | Age: 64
End: 2024-10-14
Payer: COMMERCIAL

## 2024-10-14 VITALS
BODY MASS INDEX: 25.04 KG/M2 | SYSTOLIC BLOOD PRESSURE: 136 MMHG | WEIGHT: 195 LBS | TEMPERATURE: 98 F | HEART RATE: 76 BPM | OXYGEN SATURATION: 98 % | DIASTOLIC BLOOD PRESSURE: 78 MMHG

## 2024-10-14 DIAGNOSIS — R71.8 ELEVATED HEMATOCRIT: ICD-10-CM

## 2024-10-14 DIAGNOSIS — R73.01 IFG (IMPAIRED FASTING GLUCOSE): ICD-10-CM

## 2024-10-14 DIAGNOSIS — E78.5 HYPERLIPIDEMIA LDL GOAL <100: ICD-10-CM

## 2024-10-14 DIAGNOSIS — Z00.00 ANNUAL PHYSICAL EXAM: Primary | ICD-10-CM

## 2024-10-14 DIAGNOSIS — N52.9 ERECTILE DYSFUNCTION, UNSPECIFIED ERECTILE DYSFUNCTION TYPE: ICD-10-CM

## 2024-10-14 PROBLEM — L98.9 SKIN LESION OF SCALP: Status: RESOLVED | Noted: 2020-01-22 | Resolved: 2024-10-14

## 2024-10-14 PROCEDURE — 99397 PER PM REEVAL EST PAT 65+ YR: CPT | Performed by: FAMILY MEDICINE

## 2024-10-14 NOTE — PATIENT INSTRUCTIONS
"For weight lifting supplements look at whey protein and creatinine.   Coronary Calcium Index Score to check out atherosclerotic plaque build up in your coronary arteries.     Patient Education     Routine physical for adults   The Basics   Written by the doctors and editors at [facility]   What is a physical? -- A physical is a routine visit, or \"check-up,\" with your doctor. You might also hear it called a \"wellness visit\" or \"preventive visit.\"  During each visit, the doctor will:   Ask about your physical and mental health   Ask about your habits, behaviors, and lifestyle   Do an exam   Give you vaccines if needed   Talk to you about any medicines you take   Give advice about your health   Answer your questions  Getting regular check-ups is an important part of taking care of your health. It can help your doctor find and treat any problems you have. But it's also important for preventing health problems.  A routine physical is different from a \"sick visit.\" A sick visit is when you see a doctor because of a health concern or problem. Since physicals are scheduled ahead of time, you can think about what you want to ask the doctor.  How often should I get a physical? -- It depends on your age and health. In general, for people age 21 years and older:   If you are younger than 50 years, you might be able to get a physical every 3 years.   If you are 50 years or older, your doctor might recommend a physical every year.  If you have an ongoing health condition, like diabetes or high blood pressure, your doctor will probably want to see you more often.  What happens during a physical? -- In general, each visit will include:   Physical exam - The doctor or nurse will check your height, weight, heart rate, and blood pressure. They will also look at your eyes and ears. They will ask about how you are feeling and whether you have any symptoms that bother you.   Medicines - It's a good idea to bring a list of all the medicines " "you take to each doctor visit. Your doctor will talk to you about your medicines and answer any questions. Tell them if you are having any side effects that bother you. You should also tell them if you are having trouble paying for any of your medicines.   Habits and behaviors - This includes:   Your diet   Your exercise habits   Whether you smoke, drink alcohol, or use drugs   Whether you are sexually active   Whether you feel safe at home  Your doctor will talk to you about things you can do to improve your health and lower your risk of health problems. They will also offer help and support. For example, if you want to quit smoking, they can give you advice and might prescribe medicines. If you want to improve your diet or get more physical activity, they can help you with this, too.   Lab tests, if needed - The tests you get will depend on your age and situation. For example, your doctor might want to check your:   Cholesterol   Blood sugar   Iron level   Vaccines - The recommended vaccines will depend on your age, health, and what vaccines you already had. Vaccines are very important because they can prevent certain serious or deadly infections.   Discussion of screening - \"Screening\" means checking for diseases or other health problems before they cause symptoms. Your doctor can recommend screening based on your age, risk, and preferences. This might include tests to check for:   Cancer, such as breast, prostate, cervical, ovarian, colorectal, prostate, lung, or skin cancer   Sexually transmitted infections, such as chlamydia and gonorrhea   Mental health conditions like depression and anxiety  Your doctor will talk to you about the different types of screening tests. They can help you decide which screenings to have. They can also explain what the results might mean.   Answering questions - The physical is a good time to ask the doctor or nurse questions about your health. If needed, they can refer you to " other doctors or specialists, too.  Adults older than 65 years often need other care, too. As you get older, your doctor will talk to you about:   How to prevent falling at home   Hearing or vision tests   Memory testing   How to take your medicines safely   Making sure that you have the help and support you need at home  All topics are updated as new evidence becomes available and our peer review process is complete.  This topic retrieved from [employer] on: May 02, 2024.  Topic 939715 Version 1.0  Release: 32.4.3 - C32.122  © 2024 UpToDate, Inc. and/or its affiliates. All rights reserved.  Consumer Information Use and Disclaimer   Disclaimer: This generalized information is a limited summary of diagnosis, treatment, and/or medication information. It is not meant to be comprehensive and should be used as a tool to help the user understand and/or assess potential diagnostic and treatment options. It does NOT include all information about conditions, treatments, medications, side effects, or risks that may apply to a specific patient. It is not intended to be medical advice or a substitute for the medical advice, diagnosis, or treatment of a health care provider based on the health care provider's examination and assessment of a patient's specific and unique circumstances. Patients must speak with a health care provider for complete information about their health, medical questions, and treatment options, including any risks or benefits regarding use of medications. This information does not endorse any treatments or medications as safe, effective, or approved for treating a specific patient. UpToDate, Inc. and its affiliates disclaim any warranty or liability relating to this information or the use thereof.The use of this information is governed by the Terms of Use, available at https://www.[employer].com/en/know/clinical-effectiveness-terms. 2024© UpToDate, Inc. and its affiliates and/or licensors. All rights  reserved.  Copyright   © 2024 [employer] and/or its affiliates. All rights reserved.

## 2024-10-14 NOTE — ASSESSMENT & PLAN NOTE
- Recent A1C of 5.8; stable  - Counseled patient on importance of health eating and regular exercise

## 2024-10-14 NOTE — ASSESSMENT & PLAN NOTE
- Recent lipid panel showing mixed HLD with elevations in LDL and triglycerides   - Currently on rosuvastatin 40 mg QD   - ASCVD risk of 13.7%     Plan  - Continue rosuvastatin  - Counseled patient on diet and exercise for lipid improvement  - Discussed CAC scoring

## 2024-10-14 NOTE — PROGRESS NOTES
Adult Annual Physical  Name: Valerio Wilkins      : 1960      MRN: [id number]  Encounter Provider: Noel Leonardo DO  Encounter Date: 10/14/2024   Encounter department: [facility]    Assessment & Plan  Annual physical exam  - NO acute complaints or concerns  - Denies any recent illness or change in health status        Elevated hematocrit  - Notable chronic mild elevation in hematocrit   - No history of smoking or underlying lung or renal disease     Plan  - Consider w/u for PAOLO  - Consider JAK2 testing        IFG (impaired fasting glucose)  - Recent A1C of 5.8; stable  - Counseled patient on importance of health eating and regular exercise        Hyperlipidemia LDL goal <100  - Recent lipid panel showing mixed HLD with elevations in LDL and triglycerides   - Currently on rosuvastatin 40 mg QD   - ASCVD risk of 13.7%     Plan  - Continue rosuvastatin  - Counseled patient on diet and exercise for lipid improvement  - Discussed CAC scoring         Immunizations and preventive care screenings were discussed with patient today. Appropriate education was printed on patient's after visit summary.    Discussed risks and benefits of prostate cancer screening. We discussed the controversial history of PSA screening for prostate cancer in the [place] as well as the risk of over detection and over treatment of prostate cancer by way of PSA screening.  The patient understands that PSA blood testing is an imperfect way to screen for prostate cancer and that elevated PSA levels in the blood may also be caused by infection, inflammation, prostatic trauma or manipulation, urological procedures, or by benign prostatic enlargement.    The role of the digital rectal examination in prostate cancer screening was also discussed and I discussed with him that there is large interobserver variability in the findings of digital rectal examination.    Counseling:  Alcohol/drug use: discussed  moderation in alcohol intake, the recommendations for healthy alcohol use, and avoidance of illicit drug use.  Exercise: the importance of regular exercise/physical activity was discussed. Recommend exercise 3-5 times per week for at least 30 minutes.          History of Present Illness   64 yoM PMHx of HLD, ED, IFG who presents for annual. Patient is without any acute complaints or concerns. Denies any recent illness or change in health status since last seen.       Adult Annual Physical:  Patient presents for annual physical.     Diet and Physical Activity:  - Diet/Nutrition: well balanced diet.    Depression Screening:  - PHQ-2 Score: 0    Review of Systems   Constitutional:  Negative for activity change, appetite change, chills, fatigue and fever.   HENT:  Negative for congestion, ear pain, postnasal drip and rhinorrhea.    Eyes:  Negative for pain and visual disturbance.   Respiratory:  Negative for cough, chest tightness and shortness of breath.    Cardiovascular:  Negative for chest pain, palpitations and leg swelling.   Gastrointestinal:  Negative for abdominal pain, constipation, diarrhea, nausea and vomiting.   Endocrine: Negative for polydipsia and polyuria.   Genitourinary:  Negative for decreased urine volume, difficulty urinating, dysuria, frequency and hematuria.   Musculoskeletal:  Negative for arthralgias, back pain and myalgias.   Skin:  Negative for color change and rash.   Neurological:  Negative for dizziness, light-headedness and headaches.   All other systems reviewed and are negative.    Current Outpatient Medications on File Prior to Visit   Medication Sig Dispense Refill    aspirin (ECOTRIN LOW STRENGTH) 81 mg EC tablet Take 81 mg by mouth daily      omega-3-acid ethyl esters (LOVAZA) 1 g capsule Take 2 capsules by mouth 2 (two) times a day      rosuvastatin (CRESTOR) 40 MG tablet TAKE 1 TABLET DAILY 100 tablet 1    tadalafil (Cialis) 5 MG tablet Take 1 tablet (5 mg total) by mouth daily as  needed for erectile dysfunction 60 tablet 5     No current facility-administered medications on file prior to visit.        Objective     /78 (BP Location: Left arm, Patient Position: Sitting, Cuff Size: Standard)   Pulse 76   Temp 98 °F (36.7 °C)   Wt 88.5 kg (195 lb)   SpO2 98%   BMI 25.04 kg/m²     Physical Exam  Vitals and nursing note reviewed.   Constitutional:       General: He is not in acute distress.     Appearance: Normal appearance. He is well-developed. He is not ill-appearing.   HENT:      Head: Normocephalic and atraumatic.      Right Ear: External ear normal.      Left Ear: External ear normal.      Nose: Nose normal.   Eyes:      Conjunctiva/sclera: Conjunctivae normal.   Cardiovascular:      Rate and Rhythm: Normal rate and regular rhythm.      Heart sounds: Normal heart sounds.   Pulmonary:      Effort: Pulmonary effort is normal. No respiratory distress.      Breath sounds: Normal breath sounds.   Abdominal:      Palpations: Abdomen is soft.      Tenderness: There is no abdominal tenderness.   Musculoskeletal:         General: No swelling.      Cervical back: Neck supple.      Right lower leg: No edema.      Left lower leg: No edema.   Feet:      Right foot:      Skin integrity: No ulcer, skin breakdown, erythema, warmth, callus or dry skin.      Left foot:      Skin integrity: No ulcer, skin breakdown, erythema, warmth, callus or dry skin.   Skin:     General: Skin is warm and dry.      Capillary Refill: Capillary refill takes less than 2 seconds.   Neurological:      Mental Status: He is alert.   Psychiatric:         Mood and Affect: Mood normal.

## 2024-10-14 NOTE — ASSESSMENT & PLAN NOTE
- Notable chronic mild elevation in hematocrit   - No history of smoking or underlying lung or renal disease     Plan  - Consider w/u for PAOLO  - Consider JAK2 testing

## 2025-01-17 DIAGNOSIS — E78.5 HYPERLIPIDEMIA LDL GOAL <100: ICD-10-CM

## 2025-01-17 RX ORDER — ROSUVASTATIN CALCIUM 40 MG/1
40 TABLET, COATED ORAL DAILY
Qty: 90 TABLET | Refills: 3 | Status: SHIPPED | OUTPATIENT
Start: 2025-01-17

## 2025-03-26 ENCOUNTER — APPOINTMENT (EMERGENCY)
Dept: RADIOLOGY | Facility: HOSPITAL | Age: 65
End: 2025-03-26
Payer: COMMERCIAL

## 2025-03-26 ENCOUNTER — HOSPITAL ENCOUNTER (EMERGENCY)
Facility: HOSPITAL | Age: 65
Discharge: HOME | End: 2025-03-26
Attending: EMERGENCY MEDICINE
Payer: COMMERCIAL

## 2025-03-26 VITALS
OXYGEN SATURATION: 97 % | RESPIRATION RATE: 18 BRPM | DIASTOLIC BLOOD PRESSURE: 82 MMHG | SYSTOLIC BLOOD PRESSURE: 142 MMHG | TEMPERATURE: 97.9 F | HEART RATE: 71 BPM

## 2025-03-26 DIAGNOSIS — R07.9 CHEST PAIN: Primary | ICD-10-CM

## 2025-03-26 LAB
ANION GAP SERPL CALCULATED.3IONS-SCNC: 7 MMOL/L (ref 4–13)
ATRIAL RATE: 76 BPM
BASOPHILS # BLD AUTO: 0.05 THOUSANDS/ÂΜL (ref 0–0.1)
BASOPHILS NFR BLD AUTO: 1 % (ref 0–1)
BUN SERPL-MCNC: 19 MG/DL (ref 5–25)
CALCIUM SERPL-MCNC: 9.6 MG/DL (ref 8.4–10.2)
CARDIAC TROPONIN I PNL SERPL HS: 3 NG/L (ref ?–50)
CHLORIDE SERPL-SCNC: 104 MMOL/L (ref 96–108)
CO2 SERPL-SCNC: 27 MMOL/L (ref 21–32)
CREAT SERPL-MCNC: 0.87 MG/DL (ref 0.6–1.3)
EOSINOPHIL # BLD AUTO: 0.32 THOUSAND/ÂΜL (ref 0–0.61)
EOSINOPHIL NFR BLD AUTO: 4 % (ref 0–6)
ERYTHROCYTE [DISTWIDTH] IN BLOOD BY AUTOMATED COUNT: 12.2 % (ref 11.6–15.1)
GFR SERPL CREATININE-BSD FRML MDRD: 90 ML/MIN/1.73SQ M
GLUCOSE SERPL-MCNC: 96 MG/DL (ref 65–140)
HCT VFR BLD AUTO: 47.9 % (ref 36.5–49.3)
HGB BLD-MCNC: 16.4 G/DL (ref 12–17)
IMM GRANULOCYTES # BLD AUTO: 0.02 THOUSAND/UL (ref 0–0.2)
IMM GRANULOCYTES NFR BLD AUTO: 0 % (ref 0–2)
LYMPHOCYTES # BLD AUTO: 2.68 THOUSANDS/ÂΜL (ref 0.6–4.47)
LYMPHOCYTES NFR BLD AUTO: 34 % (ref 14–44)
MCH RBC QN AUTO: 30.7 PG (ref 26.8–34.3)
MCHC RBC AUTO-ENTMCNC: 34.2 G/DL (ref 31.4–37.4)
MCV RBC AUTO: 90 FL (ref 82–98)
MONOCYTES # BLD AUTO: 0.79 THOUSAND/ÂΜL (ref 0.17–1.22)
MONOCYTES NFR BLD AUTO: 10 % (ref 4–12)
NEUTROPHILS # BLD AUTO: 4.05 THOUSANDS/ÂΜL (ref 1.85–7.62)
NEUTS SEG NFR BLD AUTO: 51 % (ref 43–75)
NRBC BLD AUTO-RTO: 0 /100 WBCS
P AXIS: 58 DEGREES
PLATELET # BLD AUTO: 260 THOUSANDS/UL (ref 149–390)
PMV BLD AUTO: 8.8 FL (ref 8.9–12.7)
POTASSIUM SERPL-SCNC: 3.6 MMOL/L (ref 3.5–5.3)
PR INTERVAL: 146 MS
QRS AXIS: 43 DEGREES
QRSD INTERVAL: 86 MS
QT INTERVAL: 390 MS
QTC INTERVAL: 439 MS
RBC # BLD AUTO: 5.35 MILLION/UL (ref 3.88–5.62)
SODIUM SERPL-SCNC: 138 MMOL/L (ref 135–147)
T WAVE AXIS: 28 DEGREES
VENTRICULAR RATE: 76 BPM
WBC # BLD AUTO: 7.91 THOUSAND/UL (ref 4.31–10.16)

## 2025-03-26 PROCEDURE — 84484 ASSAY OF TROPONIN QUANT: CPT

## 2025-03-26 PROCEDURE — 99285 EMERGENCY DEPT VISIT HI MDM: CPT | Performed by: EMERGENCY MEDICINE

## 2025-03-26 PROCEDURE — 93005 ELECTROCARDIOGRAM TRACING: CPT

## 2025-03-26 PROCEDURE — 85025 COMPLETE CBC W/AUTO DIFF WBC: CPT

## 2025-03-26 PROCEDURE — 99285 EMERGENCY DEPT VISIT HI MDM: CPT

## 2025-03-26 PROCEDURE — 71046 X-RAY EXAM CHEST 2 VIEWS: CPT

## 2025-03-26 PROCEDURE — 93010 ELECTROCARDIOGRAM REPORT: CPT | Performed by: INTERNAL MEDICINE

## 2025-03-26 PROCEDURE — 36415 COLL VENOUS BLD VENIPUNCTURE: CPT

## 2025-03-26 PROCEDURE — 80048 BASIC METABOLIC PNL TOTAL CA: CPT

## 2025-03-26 NOTE — ED ATTENDING ATTESTATION
"I, Dashawn Zaidi MD, saw and evaluated the patient. I have discussed the patient with the resident and agree with the resident's findings, Plan of Care, and MDM as documented in the resident's note, except where noted. All available labs and Radiology studies were reviewed.  I was present for key portions of any procedure(s) performed by the resident and I was immediately available to provide assistance.    At this point I agree with the current assessment done in the Emergency Department.  I have conducted an independent evaluation of this patient a history and physical is as follows:    65 yo male with a history of hyperlipidemia presents to the ED complaining of chest pain x 1 week. The patient reports an intermittent left sided \"squeezing\" pain in his anterior chest. The pain typically occurs with life stressors. He says he notices it most at work with stressful situations (works as a  in [place]). (+) Occasional \"soreness\" in the left pectoral area. No associated shortness of breath, nausea, vomiting, or diaphoresis. He denies cough and hemoptysis. No LE swelling or pain. No personal or family history of heart disease. He does not use tobacco products. No other specific complaints.    ROS: per resident physician note    Gen: NAD, AA&Ox3  HEENT: PERRL, EOMI  Neck: supple  CV: RRR  Lungs: CTA B/L  Abdomen: soft, NT/ND  Ext: no swelling or deformity  Neuro: 5/5 strength all extremities, sensation grossly intact  Skin: no rash    ED Course  The patient is very well appearing with stable vital signs and a benign physical examination. Chest pain has been intermittent x 1 week. Unclear etiology of pain. Anxiety vs musculoskeletal chest pain vs ACS vs stable angina vs pneumonia? Low clinical suspicion for PE, TAD, and PTX. Will check EKG, CXR, basic labs, and troponin. Disposition per workup and reassessment. Will continue to monitor in the ED      Critical Care Time  Procedures   "

## 2025-03-26 NOTE — ED PROVIDER NOTES
Time reflects when diagnosis was documented in both MDM as applicable and the Disposition within this note       Time User Action Codes Description Comment    3/26/2025  9:29 PM Severo Flores Add [R07.9] Chest pain           ED Disposition       ED Disposition   Discharge    Condition   Stable    Date/Time   Wed Mar 26, 2025  9:29 PM    Comment   Valerio Wilkins discharge to home/self care.                   Assessment & Plan       Medical Decision Making  Patient is a 65-year-old male presenting with chest pain.    Differential includes but not limited to MSK, anxiety, doubt ACS, pneumomediastinum, pneumothorax.  Cardiac workup ordered without acute concerns.  Patient did appear to have S3 heart sound on exam, indicating possible underlying pathology.  Patient overall well-appearing without acute concerns here.    Patient cleared for discharge with cardiology referral and return precautions.    Amount and/or Complexity of Data Reviewed  Labs: ordered.  Radiology: ordered.             Medications - No data to display    ED Risk Strat Scores   HEART Risk Score      Flowsheet Row Most Recent Value   Heart Score Risk Calculator    History 1 Filed at: 03/26/2025 2129   ECG 0 Filed at: 03/26/2025 2129   Age 2 Filed at: 03/26/2025 2129   Risk Factors 1 Filed at: 03/26/2025 2129   Troponin 0 Filed at: 03/26/2025 2129   HEART Score 4 Filed at: 03/26/2025 2129          HEART Risk Score      Flowsheet Row Most Recent Value   Heart Score Risk Calculator    History 1 Filed at: 03/26/2025 2129   ECG 0 Filed at: 03/26/2025 2129   Age 2 Filed at: 03/26/2025 2129   Risk Factors 1 Filed at: 03/26/2025 2129   Troponin 0 Filed at: 03/26/2025 2129   HEART Score 4 Filed at: 03/26/2025 2129                      Identification of Seniors at Risk      Flowsheet Row Most Recent Value   (ISAR) Identification of Seniors at Risk    Before the illness or injury that brought you to the Emergency, did you need someone to help you on a regular  basis? 0 Filed at: 03/26/2025 1838   In the last 24 hours, have you needed more help than usual? 0 Filed at: 03/26/2025 1838   Have you been hospitalized for one or more nights during the past 6 months? 0 Filed at: 03/26/2025 1838   In general, do you see well? 0 Filed at: 03/26/2025 1838   In general, do you have serious problems with your memory? 0 Filed at: 03/26/2025 1838   Do you take more than three different medications every day? 0 Filed at: 03/26/2025 1838   ISAR Score 0 Filed at: 03/26/2025 1838                SBIRT 22yo+      Flowsheet Row Most Recent Value   YODIT: How many times in the past year have you...    Used an illegal drug or used a prescription medication for non-medical reasons? Never Filed at: 03/26/2025 1922                            History of Present Illness       Chief Complaint   Patient presents with    Chest Pain     Pt has been having L sided chest pain for about a week.  Denies any radiation.  Intermittent increases and decreases in pain.  - SOB.         Past Medical History:   Diagnosis Date    Sebaceous cyst     last assessed: Aug 30, 2015 - in the back     Skin lesion of scalp 01/22/2020      Past Surgical History:   Procedure Laterality Date    NO PAST SURGERIES        Family History   Problem Relation Age of Onset    Hyperlipidemia Mother     Lung cancer Father     No Known Problems Sister     No Known Problems Brother     No Known Problems Maternal Grandmother     Diabetes Maternal Grandfather         mellitus     Hypertension Maternal Grandfather     No Known Problems Paternal Grandmother     No Known Problems Paternal Grandfather       Social History     Tobacco Use    Smoking status: Never    Smokeless tobacco: Never   Vaping Use    Vaping status: Never Used   Substance Use Topics    Alcohol use: Yes    Drug use: Never      E-Cigarette/Vaping    E-Cigarette Use Never User       E-Cigarette/Vaping Substances      I have reviewed and agree with the history as documented.      Patient is a 64-year-old male with history of hyperlipidemia presenting for chest pain.  Patient states that the chest pain has been going on for the last week and a half and is intermittent in nature.  He feels like it is there when he is stressed out at work.  He feels like it is similar to a strained muscle from when he used to weight lift.  He denies any shortness of breath or worsening pain with exertion.  He describes it as a tightness that is sometimes painful when he is squeezing his soft tissue.  He denies fever, chills, diaphoresis, lightheadedness, dizziness, abdominal pain, nausea, vomiting, numbness, tingling, or weakness.        Review of Systems        Objective       ED Triage Vitals   Temperature Pulse Blood Pressure Respirations SpO2 Patient Position - Orthostatic VS   03/26/25 1836 03/26/25 1836 03/26/25 1836 03/26/25 1836 03/26/25 1836 03/26/25 1950   97.9 °F (36.6 °C) 75 (!) 165/102 18 97 % Sitting      Temp Source Heart Rate Source BP Location FiO2 (%) Pain Score    03/26/25 1836 03/26/25 1950 03/26/25 1950 -- 03/26/25 1836    Oral Monitor Left arm  No Pain      Vitals      Date and Time Temp Pulse SpO2 Resp BP Pain Score FACES Pain Rating User   03/26/25 1950 -- 71 97 % 18 142/82 -- -- SM   03/26/25 1836 97.9 °F (36.6 °C) 75 97 % 18 165/102 No Pain -- BMM            Physical Exam  Vitals and nursing note reviewed.   Constitutional:       General: He is not in acute distress.     Appearance: He is well-developed. He is not ill-appearing, toxic-appearing or diaphoretic.   HENT:      Head: Normocephalic and atraumatic.      Mouth/Throat:      Mouth: Mucous membranes are moist.      Pharynx: Oropharynx is clear.   Cardiovascular:      Rate and Rhythm: Normal rate and regular rhythm.      Heart sounds:      Gallop present. S3 sounds present.   Pulmonary:      Effort: Pulmonary effort is normal. No respiratory distress.      Breath sounds: Normal breath sounds.   Abdominal:      General: Abdomen  is flat.      Palpations: Abdomen is soft.      Tenderness: There is no abdominal tenderness.   Musculoskeletal:         General: Normal range of motion.      Cervical back: Normal range of motion and neck supple.      Right lower leg: No tenderness. No edema.      Left lower leg: No tenderness. No edema.   Skin:     General: Skin is warm and dry.      Capillary Refill: Capillary refill takes less than 2 seconds.   Neurological:      General: No focal deficit present.      Mental Status: He is alert and oriented to person, place, and time.         Results Reviewed       Procedure Component Value Units Date/Time    HS Troponin 0hr (reflex protocol) [[id number]]  (Normal) Collected: 03/26/25 1934    Lab Status: Final result Specimen: Blood from Line, Venous Updated: 03/26/25 2017     hs TnI 0hr 3 ng/L     Basic metabolic panel [[id number]] Collected: 03/26/25 1934    Lab Status: Final result Specimen: Blood from Line, Venous Updated: 03/26/25 2008     Sodium 138 mmol/L      Potassium 3.6 mmol/L      Chloride 104 mmol/L      CO2 27 mmol/L      ANION GAP 7 mmol/L      BUN 19 mg/dL      Creatinine 0.87 mg/dL      Glucose 96 mg/dL      Calcium 9.6 mg/dL      eGFR 90 ml/min/1.73sq m     Narrative:      National Kidney Disease Foundation guidelines for Chronic Kidney Disease (CKD):     Stage 1 with normal or high GFR (GFR > 90 mL/min/1.73 square meters)    Stage 2 Mild CKD (GFR = 60-89 mL/min/1.73 square meters)    Stage 3A Moderate CKD (GFR = 45-59 mL/min/1.73 square meters)    Stage 3B Moderate CKD (GFR = 30-44 mL/min/1.73 square meters)    Stage 4 Severe CKD (GFR = 15-29 mL/min/1.73 square meters)    Stage 5 End Stage CKD (GFR <15 mL/min/1.73 square meters)  Note: GFR calculation is accurate only with a steady state creatinine    CBC and differential [[id number]]  (Abnormal) Collected: 03/26/25 1934    Lab Status: Final result Specimen: Blood from Line, Venous Updated: 03/26/25 1946     WBC 7.91 Thousand/uL      RBC 5.35  Million/uL      Hemoglobin 16.4 g/dL      Hematocrit 47.9 %      MCV 90 fL      MCH 30.7 pg      MCHC 34.2 g/dL      RDW 12.2 %      MPV 8.8 fL      Platelets 260 Thousands/uL      nRBC 0 /100 WBCs      Segmented % 51 %      Immature Grans % 0 %      Lymphocytes % 34 %      Monocytes % 10 %      Eosinophils Relative 4 %      Basophils Relative 1 %      Absolute Neutrophils 4.05 Thousands/µL      Absolute Immature Grans 0.02 Thousand/uL      Absolute Lymphocytes 2.68 Thousands/µL      Absolute Monocytes 0.79 Thousand/µL      Eosinophils Absolute 0.32 Thousand/µL      Basophils Absolute 0.05 Thousands/µL             X-ray chest 2 views   Final Interpretation by Antonio Harris MD (03/27 0824)      No acute cardiopulmonary disease.            Workstation performed: [id number]             ECG 12 Lead Documentation Only    Date/Time: 3/26/2025 7:07 PM    Performed by: Severo Flores MD  Authorized by: Severo Flores MD    Patient location:  ED  Previous ECG:     Previous ECG:  Compared to current    Similarity:  No change  Interpretation:     Interpretation: normal    Rate:     ECG rate assessment: normal    Rhythm:     Rhythm: sinus rhythm    Ectopy:     Ectopy: none    QRS:     QRS axis:  Normal    QRS intervals:  Normal  Conduction:     Conduction: normal    ST segments:     ST segments:  Normal  T waves:     T waves: normal        ED Medication and Procedure Management   Prior to Admission Medications   Prescriptions Last Dose Informant Patient Reported? Taking?   aspirin (ECOTRIN LOW STRENGTH) 81 mg EC tablet  Self Yes No   Sig: Take 81 mg by mouth daily   omega-3-acid ethyl esters (LOVAZA) 1 g capsule  Self Yes No   Sig: Take 2 capsules by mouth 2 (two) times a day   rosuvastatin (CRESTOR) 40 MG tablet   No No   Sig: TAKE 1 TABLET DAILY   tadalafil (Cialis) 5 MG tablet   No No   Sig: Take 1 tablet (5 mg total) by mouth daily as needed for erectile dysfunction      Facility-Administered Medications: None      Discharge Medication List as of 3/26/2025  9:30 PM        CONTINUE these medications which have NOT CHANGED    Details   aspirin (ECOTRIN LOW STRENGTH) 81 mg EC tablet Take 81 mg by mouth daily, Historical Med      omega-3-acid ethyl esters (LOVAZA) 1 g capsule Take 2 capsules by mouth 2 (two) times a day, Starting Fri 9/4/2015, Historical Med      rosuvastatin (CRESTOR) 40 MG tablet TAKE 1 TABLET DAILY, Starting Fri 1/17/2025, Normal      tadalafil (Cialis) 5 MG tablet Take 1 tablet (5 mg total) by mouth daily as needed for erectile dysfunction, Starting Thu 7/27/2023, Normal             ED SEPSIS DOCUMENTATION   Time reflects when diagnosis was documented in both MDM as applicable and the Disposition within this note       Time User Action Codes Description Comment    3/26/2025  9:29 PM Severo Flores Add [R07.9] Chest pain                  Severo Flores MD  03/28/25 2049

## 2025-03-27 ENCOUNTER — VBI (OUTPATIENT)
Dept: FAMILY MEDICINE CLINIC | Facility: CLINIC | Age: 65
End: 2025-03-27

## 2025-03-27 NOTE — TELEPHONE ENCOUNTER
03/27/25 9:36 AM    Patient contacted post ED visit, first outreach attempt made. Message was left for patient to return a call to the [facility] Department at [facility]: Phone [phone].    Thank you.  [facility] Foster MA  [facility]

## 2025-03-27 NOTE — DISCHARGE INSTRUCTIONS
Please follow-up with cardiology-a referral was made for you.  Please return to ED with new or worsening symptoms-see attached.

## 2025-03-28 NOTE — TELEPHONE ENCOUNTER
03/28/25 8:14 AM    Patient contacted post ED visit, [facility] department spoke with patient/caregiver and outreach was successful.    Thank you.  [facility] Foster MA  [facility]

## 2025-06-04 ENCOUNTER — CONSULT (OUTPATIENT)
Age: 65
End: 2025-06-04
Payer: COMMERCIAL

## 2025-06-04 VITALS
OXYGEN SATURATION: 97 % | HEIGHT: 74 IN | DIASTOLIC BLOOD PRESSURE: 78 MMHG | WEIGHT: 196 LBS | BODY MASS INDEX: 25.15 KG/M2 | HEART RATE: 72 BPM | SYSTOLIC BLOOD PRESSURE: 134 MMHG

## 2025-06-04 DIAGNOSIS — R07.9 CHEST PAIN, UNSPECIFIED TYPE: Primary | ICD-10-CM

## 2025-06-04 PROCEDURE — 99203 OFFICE O/P NEW LOW 30 MIN: CPT | Performed by: INTERNAL MEDICINE

## 2025-06-04 RX ORDER — DIPHENOXYLATE HYDROCHLORIDE AND ATROPINE SULFATE 2.5; .025 MG/1; MG/1
1 TABLET ORAL DAILY
COMMUNITY

## 2025-06-04 RX ORDER — ZINC GLUCONATE 50 MG
50 TABLET ORAL DAILY
COMMUNITY

## 2025-06-04 NOTE — PROGRESS NOTES
Cardiology Consultation  [facility]      Valerio Pricereji  1960  [id number]  [facility]  [address] [facility] [address]  [place] [id number] [phone]    1. Chest pain, unspecified type  Stress test only, exercise    Echo             Discussion/Summary    Isolated episode chest pain March 2025, self-limited, resolved.      Plan: Restratification stress test, echocardiogram, will follow-up with him after the above studies.  Otherwise doing well, made no changes in medical regimen.  Sensible/Matrone type diet daily activity and exercise.      History:     66 yo male referred for ED follow up March 2025 for chest pain.  He had serial high-sensitivity troponins which were nonrevealing.  His ECG was normal.    At the time, noticed it also at work,  in [place].  States stressful situations made it worse.    He was told to follow-up with cardiology for which he presents today.    He has had no recurrence.  He does lift weights, walks.  Prior avid runner.        Problem List[1]  Past Medical History[2]  Social History     Socioeconomic History    Marital status: Single     Spouse name: Not on file    Number of children: Not on file    Years of education: Not on file    Highest education level: Not on file   Occupational History    Occupation: Teacher    Tobacco Use    Smoking status: Never    Smokeless tobacco: Never   Vaping Use    Vaping status: Never Used   Substance and Sexual Activity    Alcohol use: Yes    Drug use: Never    Sexual activity: Not Currently   Other Topics Concern    Not on file   Social History Narrative    Caffeine use      Social Drivers of Health     Financial Resource Strain: Low Risk  (1/22/2020)    Overall Financial Resource Strain (CARDIA)     Difficulty of Paying Living Expenses: Not hard at all   Food Insecurity: No Food Insecurity (1/22/2020)    Hunger Vital Sign     Worried  About Running Out of Food in the Last Year: Never true     Ran Out of Food in the Last Year: Never true   Transportation Needs: No Transportation Needs (1/22/2020)    PRAPARE - Transportation     Lack of Transportation (Medical): No     Lack of Transportation (Non-Medical): No   Physical Activity: Inactive (1/22/2020)    Exercise Vital Sign     Days of Exercise per Week: 0 days     Minutes of Exercise per Session: 0 min   Stress: Stress Concern Present (1/22/2020)    [facility] Occupational [facility] - Occupational Stress Questionnaire     Feeling of Stress : To some extent   Social Connections: Unknown (1/22/2020)    Social Connection and Isolation Panel     Frequency of Communication with Friends and Family: Patient declined     Frequency of Social Gatherings with Friends and Family: Patient declined     Attends Latter-day Services: Patient declined     Active Member of Clubs or Organizations: Patient declined     Attends Club or Organization Meetings: Patient declined     Marital Status: Patient declined   Intimate Partner Violence: Unknown (1/22/2020)    Humiliation, Afraid, Rape, and Kick questionnaire     Fear of Current or Ex-Partner: Patient declined     Emotionally Abused: Patient declined     Physically Abused: Patient declined     Sexually Abused: Patient declined   Housing Stability: Not on file      Family History[3]  Past Surgical History[4]  Current Medications[5]  No Known Allergies    Social, Family and medication history as listed, reviewed and updated as necessary    Labs:   Lab Results   Component Value Date     09/17/2015    K 3.6 03/26/2025     03/26/2025    CO2 27 03/26/2025    BUN 19 03/26/2025    CREATININE 0.87 03/26/2025    CREATININE 0.90 08/13/2024    GLUCOSE 87 09/17/2015    CALCIUM 9.6 03/26/2025       Lab Results   Component Value Date    WBC 7.91 03/26/2025    HGB 16.4 03/26/2025    HGB 16.8 08/13/2024    HCT 47.9 03/26/2025    HCT 50.1 (H) 08/13/2024      "03/26/2025     08/13/2024       Lab Results   Component Value Date    CHOL 232 09/17/2015    CHOL 300 05/31/2015     Lab Results   Component Value Date    HDL 51 08/13/2024    HDL 50 02/19/2024     Lab Results   Component Value Date    LDLCALC 111 (H) 08/13/2024    LDLCALC 125 (H) 02/19/2024     Lab Results   Component Value Date    TRIG 164 (H) 08/13/2024    TRIG 120 02/19/2024     No results found for: \"LDLDIRECT\"    Lab Results   Component Value Date    ALT 23 08/13/2024    ALT 29 02/19/2024    AST 23 08/13/2024    AST 25 02/19/2024    ALKPHOS 61 08/13/2024    ALKPHOS 64 02/19/2024             No results found for: \"NTBNP\"    Lab Results   Component Value Date    HGBA1C 5.8 (H) 08/13/2024    HGBA1C 5.8 (H) 02/19/2024    HGBA1C 5.4 01/21/2021       Imaging: Reviewed in epic      Review of Systems:  14 systems reviewed and negative with exception of the above       PHYSICAL EXAM:        Vitals:    06/04/25 1028   BP: 134/78   Pulse: 72   SpO2: 97%     Body mass index is 25.16 kg/m².  Weight (last 2 days)       Date/Time Weight    06/04/25 1028 88.9 (196)               Gen: No acute distress  HEENT: anicteric, mucous membranes moist  Neck: supple, no jugular venous distention, or carotid bruit  Heart: regular, normal s1 and s2, no murmur/rub or gallop  Lungs :clear to auscultation bilaterally, no rales/rhonchi or wheeze  Abdomen: soft nontender, normoactive bowel sounds, no organomegaly  Ext: warm and perfused, normal femoral pulses, no edema, or clubbing  Skin: warm, no rashes  Neuro: AAO x 3, no focal findings  Psychiatric: normal affect  Musculoskeletal: no obvious joint deformities.        This note was completed in part utilizing direct voice recognition software.   Grammatical errors, random word insertion, spelling mistakes, and incomplete sentences may be an occasional consequence of the system secondary to software limitations, ambient noise and hardware issues. At the time of dictation, efforts were " made to edit, clarify and /or correct errors.  Please read the chart carefully and recognize, using context, where substitutions have occurred.  If you have any questions or concerns about the context, text or information contained within the body of this dictation, please contact myself, the provider, for further clarification.               [1]   Patient Active Problem List  Diagnosis    ED (erectile dysfunction)    Hyperlipidemia LDL goal <100    IFG (impaired fasting glucose)    Elevated hematocrit   [2]   Past Medical History:  Diagnosis Date    Sebaceous cyst     last assessed: Aug 30, 2015 - in the back     Skin lesion of scalp 01/22/2020   [3]   Family History  Problem Relation Name Age of Onset    Hyperlipidemia Mother      Lung cancer Father      No Known Problems Sister      No Known Problems Brother      No Known Problems Maternal Grandmother      Diabetes Maternal Grandfather          mellitus     Hypertension Maternal Grandfather      No Known Problems Paternal Grandmother      No Known Problems Paternal Grandfather     [4]   Past Surgical History:  Procedure Laterality Date    NO PAST SURGERIES     [5]   Current Outpatient Medications:     aspirin (ECOTRIN LOW STRENGTH) 81 mg EC tablet, Take 81 mg by mouth in the morning., Disp: , Rfl:     MAGNESIUM PO, Take by mouth Reports 250 mg daily., Disp: , Rfl:     multivitamin (THERAGRAN) TABS, Take 1 tablet by mouth daily, Disp: , Rfl:     omega-3-acid ethyl esters (LOVAZA) 1 g capsule, Take 2 capsules by mouth in the morning and 2 capsules in the evening., Disp: , Rfl:     rosuvastatin (CRESTOR) 40 MG tablet, TAKE 1 TABLET DAILY, Disp: 90 tablet, Rfl: 3    tadalafil (Cialis) 5 MG tablet, Take 1 tablet (5 mg total) by mouth daily as needed for erectile dysfunction, Disp: 60 tablet, Rfl: 5    zinc gluconate 50 mg tablet, Take 50 mg by mouth daily, Disp: , Rfl:

## 2025-06-11 ENCOUNTER — VBI (OUTPATIENT)
Dept: ADMINISTRATIVE | Facility: OTHER | Age: 65
End: 2025-06-11

## 2025-06-11 NOTE — TELEPHONE ENCOUNTER
06/11/25 1:56 PM    The patient was called and a message was left for patient to return a call to the [facility] Department at [facility]: Phone [phone] .    Thank you.  [facility] Denis MA  [facility]

## 2025-07-10 ENCOUNTER — RESULTS FOLLOW-UP (OUTPATIENT)
Dept: NON INVASIVE DIAGNOSTICS | Facility: HOSPITAL | Age: 65
End: 2025-07-10

## 2025-07-10 ENCOUNTER — HOSPITAL ENCOUNTER (OUTPATIENT)
Dept: NON INVASIVE DIAGNOSTICS | Facility: HOSPITAL | Age: 65
Discharge: HOME | End: 2025-07-10
Attending: INTERNAL MEDICINE
Payer: COMMERCIAL

## 2025-07-10 VITALS
DIASTOLIC BLOOD PRESSURE: 78 MMHG | WEIGHT: 195.99 LBS | HEIGHT: 74 IN | BODY MASS INDEX: 25.15 KG/M2 | SYSTOLIC BLOOD PRESSURE: 134 MMHG | HEART RATE: 72 BPM

## 2025-07-10 DIAGNOSIS — R07.9 CHEST PAIN, UNSPECIFIED TYPE: ICD-10-CM

## 2025-07-10 LAB
AORTIC ROOT: 3.1 CM
ASCENDING AORTA: 3.2 CM
BSA FOR ECHO PROCEDURE: 2.15 M2
E WAVE DECELERATION TIME: 160 MS
E/A RATIO: 0.86
FRACTIONAL SHORTENING: 35 (ref 28–44)
INTERVENTRICULAR SEPTUM IN DIASTOLE (PARASTERNAL SHORT AXIS VIEW): 1 CM
INTERVENTRICULAR SEPTUM: 1 CM (ref 0.6–1.1)
LAAS-AP2: 14.7 CM2
LAAS-AP4: 17.3 CM2
LEFT ATRIUM SIZE: 3.3 CM
LEFT ATRIUM VOLUME (MOD BIPLANE): 43 ML
LEFT ATRIUM VOLUME INDEX (MOD BIPLANE): 20 ML/M2
LEFT INTERNAL DIMENSION IN SYSTOLE: 2.6 CM (ref 2.1–4)
LEFT VENTRICULAR INTERNAL DIMENSION IN DIASTOLE: 4 CM (ref 3.5–6)
LEFT VENTRICULAR POSTERIOR WALL IN END DIASTOLE: 1 CM
LEFT VENTRICULAR STROKE VOLUME: 45 ML
LV EF US.2D.A4C+ESTIMATED: 65 %
LVSV (TEICH): 45 ML
MAX HR PERCENT: 93 %
MAX HR: 144 BPM
MV E'TISSUE VEL-LAT: 10 CM/S
MV E'TISSUE VEL-SEP: 6 CM/S
MV PEAK A VEL: 0.56 M/S
MV PEAK E VEL: 48 CM/S
MV STENOSIS PRESSURE HALF TIME: 46 MS
MV VALVE AREA P 1/2 METHOD: 4.78
RATE PRESSURE PRODUCT: NORMAL
RIGHT ATRIUM AREA SYSTOLE A4C: 15 CM2
RIGHT VENTRICLE ID DIMENSION: 3.3 CM
SL CV LEFT ATRIUM LENGTH A2C: 4.6 CM
SL CV LV EF: 65
SL CV PED ECHO LEFT VENTRICLE DIASTOLIC VOLUME (MOD BIPLANE) 2D: 69 ML
SL CV PED ECHO LEFT VENTRICLE SYSTOLIC VOLUME (MOD BIPLANE) 2D: 24 ML
SL CV STRESS RECOVERY BP: NORMAL MMHG
SL CV STRESS RECOVERY HR: 86 BPM
SL CV STRESS RECOVERY O2 SAT: 99 %
SL CV STRESS STAGE REACHED: 3
STRESS ANGINA INDEX: 0
STRESS BASELINE BP: NORMAL MMHG
STRESS BASELINE HR: 59 BPM
STRESS O2 SAT REST: 97 %
STRESS PEAK HR: 127 BPM
STRESS POST ESTIMATED WORKLOAD: 10.1 METS
STRESS POST EXERCISE DUR MIN: 9 MIN
STRESS POST EXERCISE DUR SEC: 0 SEC
STRESS POST O2 SAT PEAK: 95 %
STRESS POST PEAK BP: 176 MMHG
TR MAX PG: 25 MMHG
TR PEAK VELOCITY: 2.5 M/S
TRICUSPID ANNULAR PLANE SYSTOLIC EXCURSION: 2.1 CM
TRICUSPID VALVE PEAK REGURGITATION VELOCITY: 2.49 M/S

## 2025-07-10 PROCEDURE — 93306 TTE W/DOPPLER COMPLETE: CPT | Performed by: INTERNAL MEDICINE

## 2025-07-10 PROCEDURE — 93016 CV STRESS TEST SUPVJ ONLY: CPT | Performed by: INTERNAL MEDICINE

## 2025-07-10 PROCEDURE — 93306 TTE W/DOPPLER COMPLETE: CPT

## 2025-07-10 PROCEDURE — 93018 CV STRESS TEST I&R ONLY: CPT | Performed by: INTERNAL MEDICINE

## 2025-07-10 PROCEDURE — 93017 CV STRESS TEST TRACING ONLY: CPT

## 2025-07-10 NOTE — TELEPHONE ENCOUNTER
----- Message from Don Kearns DO sent at 7/10/2025  3:58 PM EDT -----  Please let patient know stress test was genie, echo looked fine too. Thx.   ----- Message -----  From: Miguel A Baptiste MD  Sent: 7/10/2025   3:36 PM EDT  To: Don Kearns DO

## 2025-07-11 NOTE — TELEPHONE ENCOUNTER
Pt called in wanting to know what it means on his echo when it states diastolic function mildly abnormal.  Advised pt that Dr. Kearns states that his echo looked fine and that his EF is 65% which is normal. Pt verbalized understanding.